# Patient Record
Sex: FEMALE | Race: BLACK OR AFRICAN AMERICAN | NOT HISPANIC OR LATINO | RURAL
[De-identification: names, ages, dates, MRNs, and addresses within clinical notes are randomized per-mention and may not be internally consistent; named-entity substitution may affect disease eponyms.]

---

## 2023-11-28 ENCOUNTER — HOSPITAL ENCOUNTER (OUTPATIENT)
Dept: RADIOLOGY | Facility: HOSPITAL | Age: 18
Discharge: HOME | End: 2023-11-28
Attending: ORTHOPAEDIC SURGERY
Payer: COMMERCIAL

## 2023-11-28 ENCOUNTER — OFFICE VISIT (OUTPATIENT)
Dept: ORTHOPEDICS | Facility: CLINIC | Age: 18
End: 2023-11-28
Payer: COMMERCIAL

## 2023-11-28 VITALS — WEIGHT: 146 LBS | HEIGHT: 67 IN | BODY MASS INDEX: 22.91 KG/M2

## 2023-11-28 DIAGNOSIS — S83.511A RUPTURE OF ANTERIOR CRUCIATE LIGAMENT OF RIGHT KNEE, INITIAL ENCOUNTER: ICD-10-CM

## 2023-11-28 DIAGNOSIS — M25.561 ACUTE PAIN OF RIGHT KNEE: ICD-10-CM

## 2023-11-28 DIAGNOSIS — S83.421A TEAR OF LATERAL COLLATERAL LIGAMENT OF RIGHT KNEE, INITIAL ENCOUNTER: ICD-10-CM

## 2023-11-28 DIAGNOSIS — M25.561 ACUTE PAIN OF RIGHT KNEE: Primary | ICD-10-CM

## 2023-11-28 DIAGNOSIS — S83.231A COMPLEX TEAR OF MEDIAL MENISCUS OF RIGHT KNEE AS CURRENT INJURY, INITIAL ENCOUNTER: ICD-10-CM

## 2023-11-28 PROCEDURE — 99204 OFFICE O/P NEW MOD 45 MIN: CPT | Mod: S$PBB,,, | Performed by: ORTHOPAEDIC SURGERY

## 2023-11-28 PROCEDURE — 73560 X-RAY EXAM OF KNEE 1 OR 2: CPT | Mod: 26,RT,, | Performed by: ORTHOPAEDIC SURGERY

## 2023-11-28 PROCEDURE — 73560 XR KNEE 1 OR 2 VIEW RIGHT: ICD-10-PCS | Mod: 26,RT,, | Performed by: ORTHOPAEDIC SURGERY

## 2023-11-28 PROCEDURE — 73560 X-RAY EXAM OF KNEE 1 OR 2: CPT | Mod: TC,RT

## 2023-11-28 PROCEDURE — 99203 OFFICE O/P NEW LOW 30 MIN: CPT | Mod: PBBFAC | Performed by: ORTHOPAEDIC SURGERY

## 2023-11-28 PROCEDURE — 99204 PR OFFICE/OUTPT VISIT, NEW, LEVL IV, 45-59 MIN: ICD-10-PCS | Mod: S$PBB,,, | Performed by: ORTHOPAEDIC SURGERY

## 2023-11-28 RX ORDER — TRAMADOL HYDROCHLORIDE 50 MG/1
50 TABLET ORAL EVERY 6 HOURS
Qty: 30 TABLET | Refills: 0 | Status: SHIPPED | OUTPATIENT
Start: 2023-11-28

## 2023-11-28 RX ORDER — NAPROXEN 500 MG/1
500 TABLET ORAL 2 TIMES DAILY WITH MEALS
Qty: 60 TABLET | Refills: 3 | Status: SHIPPED | OUTPATIENT
Start: 2023-11-28

## 2023-11-28 RX ORDER — METHYLPREDNISOLONE 4 MG/1
TABLET ORAL
Qty: 21 EACH | Refills: 0 | Status: SHIPPED | OUTPATIENT
Start: 2023-11-28 | End: 2023-12-19

## 2023-11-28 NOTE — PROGRESS NOTES
ASSESSMENT:      ICD-10-CM ICD-9-CM   1. Acute pain of right knee  M25.561 719.46   2. Rupture of anterior cruciate ligament of right knee, initial encounter  S83.511A 844.2   3. Tear of lateral collateral ligament of right knee, initial encounter  S83.421A 844.0   4. Complex tear of medial meniscus of right knee as current injury, initial encounter  S83.231A 836.0       PLAN:     Findings and treatment options were discussed with the patient regarding the diagnosis.   All questions were answered regarding Maggie Pulliam 's painful knee.      Natural history and expected course discussed. Questions answered. Educational materials distributed. Rest, ice, compression, and elevation (RICE) therapy. MRI.    Patient Instructions   Given the above exam findings, I suspect the patient has an ACL injury.  I have ordered and reviewed her radiographs today and would like to obtain advanced imaging as this time as I am highly concerned for meniscal, chondral, and/or ligamentous injury in this painful knee.  Typical operative and nonoperative treatment measures were reviewed in great detail today. Risks, benefits, and alternatives as it relates to this potential injury were discussed today.  Plan is to proceed with an MRI to assess for internal derangement. Will follow-up after study to discuss results. Will reconsider treatment options at that time.     This is a full-thickness ACL tear as well as injury to the posterolateral corner meniscus.  Plan will be for ACL reconstruction with quadriceps tendon autograft versus hamstring autograft.  Also plan for possible posterolateral corner reconstruction and meniscus repair.  Risks benefits alternatives were discussed.  Degree of convalescence following surgery was also discussed.  I communicated this over the phone after her MRI.  She is going to discuss with her family and get back in touch with us.  Medrol Dosepak naproxen and pain medication were today.  Will need 2 weeks of  preoperative pre rehabilitation to improve range of motion most likely prior surgery    IMAGING:   X-Ray Knee 1 or 2 View Right    Result Date: 11/28/2023  See Procedure Notes for results. IMPRESSION: Please see Ortho procedure notes for report.  This procedure was auto-finalized by: Virtual Radiologist   Two views right knee were obtained today demonstrating increased widening of the lateral joint space.  No discernible fracture or pathologic bone seen.  MRI Knee Without Contrast Right    Result Date: 11/28/2023  EXAMINATION: MRI KNEE WITHOUT CONTRAST RIGHT CLINICAL HISTORY: RIGHT KNEE PAIN; Pain in right knee TECHNIQUE: Axial sagittal and coronal imaging of the right knee is performed using T1, proton density, proton density fat-sat, STIR and gradient sequences. COMPARISON: None available FINDINGS: The anterior cruciate ligament is completely torn.  Posterior cruciate ligament is intact without evidence of tear. There is edema in the lateral femoral condyle near the sulcus.  Subchondral fracture line visible at this location.  There is edema in the posterior tibial condyles without distinct fracture line. There is edema in the posterior soft tissues and posterior capsule injury present.  Suggestion of Wrisberg ligament near the posterior horn lateral meniscus that is not visualized completely in the intercondylar notch. There is injury to the meniscopopliteal fascicles in the posterolateral corner.  Otherwise the menisci are normal in signal and morphology. No evidence of meniscal tear demonstrated.  There is edema in the parameniscal soft tissues adjacent to the posterior horn. There is fluid signal around the fibular collateral ligament.  The ligament appears intact.  Popliteus tendon appears in good position and intact.  Superficial medial collateral ligament is intact with adjacent fluid signal.  There is tearing of the deep fibers of the medial collateral ligament on the femoral side. The extensor mechanism  is intact and appears within normal limits. No other abnormal osseous of marrow signal or edema is seen. No focal cartilage defect is present.     Complete tear of anterior cruciate ligament.  Posterolateral corner injury.  Multiple contusions and with subchondral fracture line visible in the lateral femoral condyle.  Posterior capsule and including injury of the Wrisberg ligament.  Meniscocapsular injury adjacent the posterior horn medial meniscus and tearing of the deep fibers of the medial collateral ligament on the femoral side. Electronically signed by: Chi Dinh Date:    11/28/2023 Time:    12:02    X-Ray Knee 1 or 2 View Right    Result Date: 11/28/2023  See Procedure Notes for results. IMPRESSION: Please see Ortho procedure notes for report.  This procedure was auto-finalized by: Virtual Radiologist       CC: Knee pain    18 y.o. Female who presents as a new patient to me for evaluation of  right knee pain.    Occupation: student athlete  Pain has been present for 1 day.  Injury description Last night during her basketball game she landed wrong when she came down. She reports she felt a pop in her knee.   She applied ice last night after the injury. She was evaluated by her AT and referred to clinic this morning.   Mechanical symptoms, such as clicking, locking, and popping are not present.    Swelling and effusions  are present.   The patient does  describe symptoms of knee instability, such as the knee buckling and the knee giving way.   Symptoms are worsened with activity.  Better with rest. Treatment thus far has included rest, activity modifications, and oral medications.    she  has not had formal physical therapy.  she has not had prior injections injections into the knee.   she has not had previous advanced imaging such as MRI.     Here today to discuss diagnosis and treatment options.          REVIEW OF SYSTEMS:   Constitution: Negative. Negative for chills, fever and night sweats.     Hematologic/Lymphatic: Negative for bleeding problem. Does not bruise/bleed easily.   Skin: Negative for dry skin, itching and rash.   Musculoskeletal: Negative for falls. Positive for knee pain and muscle weakness.     All other review of symptoms were reviewed and found to be noncontributory.     PAST MEDICAL HISTORY:   History reviewed. No pertinent past medical history.    PAST SURGICAL HISTORY:   History reviewed. No pertinent surgical history.    FAMILY HISTORY:   Family History   Problem Relation Age of Onset    Hypertension Mother     Hypertension Father        SOCIAL HISTORY:   Social History     Socioeconomic History    Marital status: Single   Tobacco Use    Smoking status: Never    Smokeless tobacco: Never       MEDICATIONS:   No current outpatient medications on file.    ALLERGIES:   Review of patient's allergies indicates:   Allergen Reactions    Lavender         PHYSICAL EXAMINATION:    General    Nursing note and vitals reviewed.  HENT:   Nose: Nose normal.   Pulmonary/Chest: No respiratory distress. She exhibits no tenderness.   Abdominal: Soft. She exhibits no distension. There is no abdominal tenderness.   Psychiatric: Thought content normal.           Right Knee Exam     Tenderness   The patient is tender to palpation of the lateral joint line.    Range of Motion   Extension:  abnormal   Flexion:  abnormal     Tests   Meniscus   Astrid:  Medial - positive   Ligament Examination   Lachman: abnormal   MCL - Valgus: normal (0 to 2mm)  Pivot Shift: abnormal    Muscle Strength   Right Lower Extremity   Quadriceps:  4/5   Hamstrin/5         Orders Placed This Encounter   Procedures    MRI Knee Without Contrast Right     Standing Status:   Future     Number of Occurrences:   1     Standing Expiration Date:   2024     Order Specific Question:   Does the patient have or ever had a pacemaker or a defibrillator (Note: Some facilities may not be able to schedule an MRI for patients with  pacemakers and defibrillators. You should contact your local radiology dept to determine if this is the case.)?     Answer:   No     Order Specific Question:   Does the patient have an aneurysm or surgical clip, pump, nerve/brain stimulator, middle/inner ear prosthesis, or other metal implant or foreign object (bullet, shrapnel)? If they have a card related to their implant, ask them to bring it. Issues related to the implant may cause the MRI to be delayed.     Answer:   No     Order Specific Question:   Will the patient require sedation?     Answer:   No     Order Specific Question:   May the Radiologist modify the order per protocol to meet the clinical needs of the patient?     Answer:   Yes     Order Specific Question:   Does the patient have on a skin patch for medication with aluminized backing?     Answer:   No       Procedures

## 2023-11-28 NOTE — PATIENT INSTRUCTIONS
Given the above exam findings, I suspect the patient has an ACL injury.  I have ordered and reviewed her radiographs today and would like to obtain advanced imaging as this time as I am highly concerned for meniscal, chondral, and/or ligamentous injury in this painful knee.  Typical operative and nonoperative treatment measures were reviewed in great detail today. Risks, benefits, and alternatives as it relates to this potential injury were discussed today.  Plan is to proceed with an MRI to assess for internal derangement. Will follow-up after study to discuss results. Will reconsider treatment options at that time.

## 2024-02-12 DIAGNOSIS — S83.511A COMPLETE TEAR OF ANTERIOR CRUCIATE LIGAMENT OF KNEE, RIGHT, INITIAL ENCOUNTER: Primary | ICD-10-CM

## 2024-02-15 ENCOUNTER — CLINICAL SUPPORT (OUTPATIENT)
Dept: REHABILITATION | Facility: HOSPITAL | Age: 19
End: 2024-02-15
Payer: COMMERCIAL

## 2024-02-15 DIAGNOSIS — S83.511A COMPLETE TEAR OF ANTERIOR CRUCIATE LIGAMENT OF KNEE, RIGHT, INITIAL ENCOUNTER: ICD-10-CM

## 2024-02-15 PROCEDURE — 97110 THERAPEUTIC EXERCISES: CPT

## 2024-02-15 PROCEDURE — 97161 PT EVAL LOW COMPLEX 20 MIN: CPT

## 2024-02-15 PROCEDURE — 97014 ELECTRIC STIMULATION THERAPY: CPT

## 2024-02-15 NOTE — PLAN OF CARE
[facility]   Physical Therapy Initial Evaluation      Name: Maggie Pulliam  Clinic Number: [id number]    Therapy Diagnosis:   Encounter Diagnosis   Name Primary?    Complete tear of anterior cruciate ligament of knee, right, initial encounter         Physician: Juan M Lopez MD    Physician Orders: PT Eval and Treat right acl repair  Medical Diagnosis from Referral: Right ACL repair  Evaluation Date: 2/15/2024  Plan of Care Expiration: 4/10/24  Progress Note Due: 4/10/24  Date of Surgery: 1/16/24  Visit # / Visits authorized: 1/ 25   FOTO: 1/ 3    Precautions: Standard     Time In: 1125  Time Out: 1220  Total Billable Time: 55 minutes    Subjective     Date of onset: surgery 1/16/24 due to injury during basketball at [employer]     History of current condition - Maggie reports: she injured her right acl in a basketball game at [employer] on 11/27/23    Falls: during basketball injury    Imaging: MRI studies: outside of [facility]    Prior Therapy: she has been receiving therapy until last week and she is moving back to school and needs to continue PT.   Social History: patient lives  in athlete cottages at [employer]  Occupation: student  Prior Level of Function: independent   Current Level of Function: amb with crutches     Pain:  Current 2/10, worst 6/10, best 0/10   Location: bilateral knee    Description: stiff and achy  Aggravating Factors: brace bothers her.   Easing Factors:  removing brace and propping her let up    Patients goals: return to basketball.      Medical History:   No past medical history on file.    Surgical History:   Maggie Pulliam  has no past surgical history on file.    Medications:   Maggie has a current medication list which includes the following prescription(s): naproxen and tramadol.    Allergies:   Review of patient's allergies indicates:   Allergen Reactions    Lavender         Objective      Observation: patient amb into clinic with brace and unilateral crutch    Posture:  WFL      Range of Motion:   Knee Left active Left Passive Right Active R passive   Flexion 141 141 70 75   Extension 0 0 0 0     14 degree extension lag with SLR on right LE.       Lower Extremity Strength  Right LE  Left LE    Knee extension: 3-/5 Knee extension: 5/5   Knee flexion: 3-/5 Knee flexion: 5/5   Hip flexion: 4/5 Hip flexion: 5/5   Hip extension:  4/5 Hip extension: 5/5   Hip abduction: 4/5 Hip abduction: 5/5   Hip adduction: 4/5 Hip adduction 5/5   Ankle dorsiflexion: 5/5 Ankle dorsiflexion: 5/5   Ankle plantarflexion: 5/5 Ankle plantarflexion: 5/5           Special Tests:  None indicated due to recent surgery    Function:    - Sit <--> Stand:independent   - Bed Mobility: independent        Joint Mobility: WFL  Patellar    Palpation: no pain    Sensation: WFL    Edema: See measurements below    Girth Measurement Joint line   Left 33.5 cm cm   Right 35.5 cm        Intake Outcome Measure for FOTO Intake Survey    Therapist reviewed FOTO scores for Maggie Pulliam on 2/15/2024.   FOTO report - see Media section or FOTO account episode details.    Intake Score: 63%         Treatment     Total Treatment time (time-based codes) separate from Evaluation: 25 minutes     Maggie received the treatments listed below:      therapeutic exercises to develop strength and ROM for 25 minutes including:  Heel slides with ankle pumps at end range x 10 with 10 seconds each.  Flexion 100 degrees after heel slides.  Short sit knee flexion stretch with ankle pump at end range and forward body movement to increase ROM.   All of the following performed with the assistance of NMES  Quad sets x 4 min  10 sec on and 20 seconds off  Bilateral SAQ at 0-15 degrees 10 sec on and 20 sec off with manual assist for AAROM on right x 4 min  SLR 10 sec on and 20 sec off with verbal and tactile cues for knee extension control x 4 min.     supervised modalities after being cleared for contradictions: NMES Electrical Stimulation:  Maggie received  NMES Electrical Stimulation to elicit muscle contraction of the right quad. Pt received stimulation at a rate of 50 pps with symmetric current, 2 second ramp with 10 second on time and 20 second off time for a total of 12 min. Patient tolerated treatment well without any adverse effects.     Patient Education and Home Exercises     Education provided:   - HEP, Plan of care, and eval results.     Written Home Exercises Provided: yes. Exercises were reviewed and Maggie was able to demonstrate them prior to the end of the session.  Maggie demonstrated good  understanding of the education provided. See EMR under Patient Instructions for exercises provided during therapy sessions.    Assessment     Maggie is a 18 y.o. female referred to outpatient Physical Therapy with a medical diagnosis of right ACL repair. Patient presents with limited ROM, decreased strength, dependence on brace and crutch, poor quad control and need for skilled PT.     Patient prognosis is Excellent.   Patient will benefit from skilled outpatient Physical Therapy to address the deficits stated above and in the chart below, provide patient /family education, and to maximize patientt's level of independence.     Plan of care discussed with patient: Yes  Patient's spiritual, cultural and educational needs considered and patient is agreeable to the plan of care and goals as stated below:     Anticipated Barriers for therapy: none at this time    Medical Necessity is demonstrated by the following  History  Co-morbidities and personal factors that may impact the plan of care [x] LOW: no personal factors / co-morbidities  [] MODERATE: 1-2 personal factors / co-morbidities  [] HIGH: 3+ personal factors / co-morbidities    Moderate / High Support Documentation:   Co-morbidities affecting plan of care:     Personal Factors:   age     Examination  Body Structures and Functions, activity limitations and participation restrictions that may impact the plan of care  [] LOW: addressing 1-2 elements  [x] MODERATE: 3+ elements  [] HIGH: 4+ elements (please support below)    Moderate / High Support Documentation: ROM, strength, neuro control gait, etc.      Clinical Presentation [] LOW: stable  [x] MODERATE: Evolving  [] HIGH: Unstable     Decision Making/ Complexity Score: low       Goals:  Short Term Goals: 4 weeks   Pt will increase right knee flexion to 115, knee extension to 0 degrees, and quad lag to 0 degrees to allow patient normal gait pattern.  Pt will increase right knee strength to 3+/5 to allow patient to safely return to prior level of function   3.  Pt will report decrease in pain to 2/10 to improve quality of life  4.  Patient will ambulate 150 feet independently without AD with no deviation       Long Term Goals: 8 weeks   Pt will increase right knee flexion to 125, knee extension to 0 degrees, and quad lag to 0 degrees to allow patient normal gait pattern.  Pt will increase right knee strength to 4+/5 to allow patient to safely return to prior level of function   3.  Pt will report decrease in pain to 1/10 to improve quality of life  4.  Patient will ascend and descend 4 steps with no rail independently alternating steps independently without deviation   5. Patient will run on level surface x 100 feet with no deviation for return to sports.     Plan     Plan of care Certification: 2/15/2024 to 4/10/24.    Outpatient Physical Therapy 3 times weekly for 8 weeks to include the following interventions: Electrical Stimulation NMES, Gait Training, Manual Therapy, Moist Heat/ Ice, Neuromuscular Re-ed, Patient Education, Therapeutic Activities, and Therapeutic Exercise.     ADIN MULTANI, PT, ATP  02/15/2024              Physician's Signature: _________________________________________ Date: ________________

## 2024-02-19 ENCOUNTER — CLINICAL SUPPORT (OUTPATIENT)
Dept: REHABILITATION | Facility: HOSPITAL | Age: 19
End: 2024-02-19
Payer: COMMERCIAL

## 2024-02-19 DIAGNOSIS — S83.511A COMPLETE TEAR OF ANTERIOR CRUCIATE LIGAMENT OF KNEE, RIGHT, INITIAL ENCOUNTER: Primary | ICD-10-CM

## 2024-02-19 PROCEDURE — 97112 NEUROMUSCULAR REEDUCATION: CPT | Mod: CQ

## 2024-02-19 PROCEDURE — 97014 ELECTRIC STIMULATION THERAPY: CPT | Mod: CQ

## 2024-02-19 PROCEDURE — 97110 THERAPEUTIC EXERCISES: CPT | Mod: CQ

## 2024-02-19 NOTE — PROGRESS NOTES
[facility]   Physical Therapy Treatment Note      Name: Maggie Pulliam  [facility] Number: [id number]    Therapy Diagnosis:   Encounter Diagnosis   Name Primary?    Complete tear of anterior cruciate ligament of knee, right, initial encounter Yes     Physician: Order, Paper    Visit Date: 2/19/2024    Physician Orders: PT Eval and Treat right acl repair  Medical Diagnosis from Referral: Right ACL repair  Evaluation Date: 2/15/2024  Plan of Care Expiration: 4/10/24  Progress Note Due: 4/10/24  Date of Surgery: 1/16/24  Visit # / Visits authorized: 2/ 25   FOTO: 1/ 3    PTA Visit #: 1/5     Time In: 800  Time Out: 853  Total Billable Time: 53 minutes    Subjective     Pt reports: no pain at this time.  She was compliant with home exercise program.  Response to previous treatment: good  Functional change: none    Pain: 0/10  Location: right knee      Objective      Knee Left active Left Passive Right Active R passive   Flexion 141 141 70 75   Extension 0 0 0 0      14 degree extension lag with SLR on right LE.         Lower Extremity Strength  Right LE   Left LE     Knee extension: 3-/5 Knee extension: 5/5   Knee flexion: 3-/5 Knee flexion: 5/5   Hip flexion: 4/5 Hip flexion: 5/5   Hip extension:  4/5 Hip extension: 5/5   Hip abduction: 4/5 Hip abduction: 5/5   Hip adduction: 4/5 Hip adduction 5/5   Ankle dorsiflexion: 5/5 Ankle dorsiflexion: 5/5   Ankle plantarflexion: 5/5 Ankle plantarflexion: 5/5           Treatment     Maggie received the treatments listed below:      therapeutic exercises to develop strength, ROM, and flexibility to Right Lower extremity for 26 minutes including:  Quad sets x 4 minutes with towel underneath knee (with NMES)  SAQ x 4 minutes on large bolster (with NMES)  SLR x 4 minutes (with NMES)  Knee flexion stretch with strap 10 x 10s/h with ankle pump at end range in long sitting   Seated heel slides 2 x 10 with 5s/h at end range   Seated hip adduction 2 x 20 3s/h      neuromuscular re-education activities to improve: Balance, Coordination, Kinesthetic, and Proprioception for 15 minutes. The following activities were included:  Standing in // bars patient performed Heel raises, R SLR, Hip abduction, hs curls, mini squats  2 x 10       supervised modalities after being cleared for contradictions: NMES Electrical Stimulation:  Maggie received NMES Electrical Stimulation to elicit muscle contraction of the R Quadriceps. Pt received stimulation at a rate of 29 pps with symmetric current, ramp of 2 seconds with 10 second on time and 20 second off time for 12 minutes. Patient tolerated treatment well without any adverse effects.       Patient Education and Home Exercises       Education provided:   - Body mechanics to execute exercises correctly     Written Home Exercises Provided: yes. Exercises were reviewed and Maggie was able to demonstrate them prior to the end of the session.  Maggie demonstrated good  understanding of the education provided. See EMR under Patient Instructions for exercises provided during therapy sessions    Assessment   Maggie tolerated treatment well this morning being able to progress from small bolster to large bolster with SAQ.     Maggie is a 18 y.o. female referred to outpatient Physical Therapy with a medical diagnosis of right ACL repair. Patient presents with limited ROM, decreased strength, dependence on brace and crutch, poor quad control and need for skilled PT.       Maggie Is progressing well towards her goals.   Pt prognosis is Good.     Pt will continue to benefit from skilled outpatient physical therapy to address the deficits listed in the problem list box on initial evaluation, provide pt/family education and to maximize pt's level of independence in the home and community environment.     Pt's spiritual, cultural and educational needs considered and pt agreeable to plan of care and goals.     Anticipated barriers to physical therapy:  none      Short Term Goals: 4 weeks   Pt will increase right knee flexion to 115, knee extension to 0 degrees, and quad lag to 0 degrees to allow patient normal gait pattern.  Pt will increase right knee strength to 3+/5 to allow patient to safely return to prior level of function   3.  Pt will report decrease in pain to 2/10 to improve quality of life  4.  Patient will ambulate 150 feet independently without AD with no deviation         Long Term Goals: 8 weeks   Pt will increase right knee flexion to 125, knee extension to 0 degrees, and quad lag to 0 degrees to allow patient normal gait pattern.  Pt will increase right knee strength to 4+/5 to allow patient to safely return to prior level of function   3.  Pt will report decrease in pain to 1/10 to improve quality of life  4.  Patient will ascend and descend 4 steps with no rail independently alternating steps independently without deviation   5. Patient will run on level surface x 100 feet with no deviation for return to sports.     Plan     Outpatient Physical Therapy 3 times weekly for 8 weeks to include the following interventions: Electrical Stimulation NMES, Gait Training, Manual Therapy, Moist Heat/ Ice, Neuromuscular Re-ed, Patient Education, Therapeutic Activities, and Therapeutic Exercise.     Patricio Chris, PTA

## 2024-02-21 ENCOUNTER — CLINICAL SUPPORT (OUTPATIENT)
Dept: REHABILITATION | Facility: HOSPITAL | Age: 19
End: 2024-02-21
Payer: COMMERCIAL

## 2024-02-21 DIAGNOSIS — S83.511A COMPLETE TEAR OF ANTERIOR CRUCIATE LIGAMENT OF KNEE, RIGHT, INITIAL ENCOUNTER: Primary | ICD-10-CM

## 2024-02-21 PROCEDURE — 97110 THERAPEUTIC EXERCISES: CPT | Mod: CQ

## 2024-02-21 PROCEDURE — 97112 NEUROMUSCULAR REEDUCATION: CPT | Mod: CQ

## 2024-02-21 PROCEDURE — 97014 ELECTRIC STIMULATION THERAPY: CPT | Mod: CQ

## 2024-02-21 NOTE — PROGRESS NOTES
[facility]   Physical Therapy Treatment Note      Name: Maggie Pulliam  Clinic Number: [id number]    Therapy Diagnosis:   Encounter Diagnosis   Name Primary?    Complete tear of anterior cruciate ligament of knee, right, initial encounter Yes     Physician: Juan M Lopez MD    Visit Date: 2/21/2024    Physician Orders: PT Eval and Treat right acl repair  Medical Diagnosis from Referral: Right ACL repair  Evaluation Date: 2/15/2024  Plan of Care Expiration: 4/10/24  Progress Note Due: 4/10/24  Date of Surgery: 1/16/24  Visit # / Visits authorized: 2/ 25   FOTO: 1/ 3    PTA Visit #: 1/5     Time In:1050  Time Out: 1135  Total Billable Time: 45 minutes    Subjective     Pt reports: no pain at this time.  She was compliant with home exercise program.  Response to previous treatment: good  Functional change: none    Pain: 0/10  Location: right knee      Objective      Knee Left active Left Passive Right Active R passive   Flexion 141 141 70 75   Extension 0 0 0 0      14 degree extension lag with SLR on right LE.         Lower Extremity Strength  Right LE   Left LE     Knee extension: 3-/5 Knee extension: 5/5   Knee flexion: 3-/5 Knee flexion: 5/5   Hip flexion: 4/5 Hip flexion: 5/5   Hip extension:  4/5 Hip extension: 5/5   Hip abduction: 4/5 Hip abduction: 5/5   Hip adduction: 4/5 Hip adduction 5/5   Ankle dorsiflexion: 5/5 Ankle dorsiflexion: 5/5   Ankle plantarflexion: 5/5 Ankle plantarflexion: 5/5           Treatment     Maggie received the treatments listed below:    -Protocol 2/16-3/5  therapeutic exercises to develop strength, ROM, and flexibility to Right Lower extremity for 18 minutes including:  Quad sets x 4 minutes with towel underneath knee (with NMES)  SAQ x 4 minutes on large bolster (with NMES)  SLR x 4 minutes (with NMES)  Knee flexion stretch with strap 10 x 10s/h with ankle pump at end range in long sitting   Seated heel slides 2 x 10 with 5s/h at end range   Seated hip  adduction 2 x 20 3s/h   Slant board stretch 3 x 30s/h    neuromuscular re-education activities to improve: Balance, Coordination, Kinesthetic, and Proprioception for 15 minutes. The following activities were included:  Standing in // bars patient performed Heel raises, R SLR, Hip abduction, hs curls, mini squats  2 x 10       supervised modalities after being cleared for contradictions: NMES Electrical Stimulation:  Maggie received NMES Electrical Stimulation to elicit muscle contraction of the R Quadriceps. Pt received stimulation at a rate of 29 pps with symmetric current, ramp of 2 seconds with 10 second on time and 20 second off time for 12 minutes. Patient tolerated treatment well without any adverse effects.       Patient Education and Home Exercises       Education provided:   - Body mechanics to execute exercises correctly     Written Home Exercises Provided: yes. Exercises were reviewed and Maggie was able to demonstrate them prior to the end of the session.  Maggie demonstrated good  understanding of the education provided. See EMR under Patient Instructions for exercises provided during therapy sessions    Assessment   Maggie tolerated treatment well this morning. She had no problems with any new exercises, and says her HEP is going well with no difficulty. Maggie was accompanied by her  during this treatment session.    Maggie is a 18 y.o. female referred to outpatient Physical Therapy with a medical diagnosis of right ACL repair. Patient presents with limited ROM, decreased strength, dependence on brace and crutch, poor quad control and need for skilled PT.       Maggie Is progressing well towards her goals.   Pt prognosis is Good.     Pt will continue to benefit from skilled outpatient physical therapy to address the deficits listed in the problem list box on initial evaluation, provide pt/family education and to maximize pt's level of independence in the home and community environment.     Pt's  spiritual, cultural and educational needs considered and pt agreeable to plan of care and goals.     Anticipated barriers to physical therapy: none      Short Term Goals: 4 weeks   Pt will increase right knee flexion to 115, knee extension to 0 degrees, and quad lag to 0 degrees to allow patient normal gait pattern.  Pt will increase right knee strength to 3+/5 to allow patient to safely return to prior level of function   3.  Pt will report decrease in pain to 2/10 to improve quality of life  4.  Patient will ambulate 150 feet independently without AD with no deviation         Long Term Goals: 8 weeks   Pt will increase right knee flexion to 125, knee extension to 0 degrees, and quad lag to 0 degrees to allow patient normal gait pattern.  Pt will increase right knee strength to 4+/5 to allow patient to safely return to prior level of function   3.  Pt will report decrease in pain to 1/10 to improve quality of life  4.  Patient will ascend and descend 4 steps with no rail independently alternating steps independently without deviation   5. Patient will run on level surface x 100 feet with no deviation for return to sports.     Plan     Outpatient Physical Therapy 3 times weekly for 8 weeks to include the following interventions: Electrical Stimulation NMES, Gait Training, Manual Therapy, Moist Heat/ Ice, Neuromuscular Re-ed, Patient Education, Therapeutic Activities, and Therapeutic Exercise.     Patricio Chris, PTA

## 2024-02-23 ENCOUNTER — CLINICAL SUPPORT (OUTPATIENT)
Dept: REHABILITATION | Facility: HOSPITAL | Age: 19
End: 2024-02-23
Payer: COMMERCIAL

## 2024-02-23 DIAGNOSIS — S83.511A COMPLETE TEAR OF ANTERIOR CRUCIATE LIGAMENT OF KNEE, RIGHT, INITIAL ENCOUNTER: Primary | ICD-10-CM

## 2024-02-23 PROCEDURE — 97112 NEUROMUSCULAR REEDUCATION: CPT | Mod: CQ

## 2024-02-23 PROCEDURE — 97110 THERAPEUTIC EXERCISES: CPT | Mod: CQ

## 2024-02-23 NOTE — PROGRESS NOTES
"[facility]   Physical Therapy Treatment Note      Name: Maggie Pulliam  Clinic Number: [id number]    Therapy Diagnosis:   Encounter Diagnosis   Name Primary?    Complete tear of anterior cruciate ligament of knee, right, initial encounter Yes     Physician: Juan M Lopez MD    Visit Date: 2/23/2024    Physician Orders: PT Eval and Treat right acl repair  Medical Diagnosis from Referral: Right ACL repair  Evaluation Date: 2/15/2024  Plan of Care Expiration: 4/10/24  Progress Note Due: 4/10/24  Date of Surgery: 1/16/24  Visit # 3/25  Visits authorized: 25   FOTO: 1/ 3    PTA Visit #: 3/5     Time In:1055  Time Out: 1140  Total Billable Time: 45 minutes    Subjective     Pt reports: "no pain but is stiff this morning"  She was compliant with home exercise program.  Response to previous treatment: good  Functional change: none    Pain: 0/10  Location: right knee      Objective      Knee Left active Left Passive Right Active R passive   Flexion 141 141 70 75   Extension 0 0 0 0      14 degree extension lag with SLR on right LE.         Lower Extremity Strength  Right LE   Left LE     Knee extension: 3-/5 Knee extension: 5/5   Knee flexion: 3-/5 Knee flexion: 5/5   Hip flexion: 4/5 Hip flexion: 5/5   Hip extension:  4/5 Hip extension: 5/5   Hip abduction: 4/5 Hip abduction: 5/5   Hip adduction: 4/5 Hip adduction 5/5   Ankle dorsiflexion: 5/5 Ankle dorsiflexion: 5/5   Ankle plantarflexion: 5/5 Ankle plantarflexion: 5/5           Treatment     Maggie received the treatments listed below:    -Protocol 2/16-3/5  therapeutic exercises to develop strength, ROM, and flexibility to Right Lower extremity for 30 minutes including:  Quad sets 2 x 12 with towel underneath knee   SAQ 2 x 10 on large bolster   SLR, side raises 2 x 10  Knee flexion stretch with strap 10 x 10s/h with ankle pump at end range in long sitting   Hamstring stretch 5 x 10s/h   Seated heel slides 2 x 10 with 3s/h at end range   Seated " hip adduction 2 x 20 3s/h   Slant board stretch 3 x 30s/h    neuromuscular re-education activities to improve: Balance, Coordination, Kinesthetic, and Proprioception to right knee for 15 minutes. The following activities were included:  Standing in // bars patient performed Heel raises, R SLR, hip flexion, Hip abduction, hs curls, mini squats 2 x 12       Not today - supervised modalities after being cleared for contradictions: NMES Electrical Stimulation:  Maggie received NMES Electrical Stimulation to elicit muscle contraction of the R Quadriceps. Pt received stimulation at a rate of 29 pps with symmetric current, ramp of 2 seconds with 10 second on time and 20 second off time for 12 minutes. Patient tolerated treatment well without any adverse effects.       Patient Education and Home Exercises       Education provided:   - Body mechanics to execute exercises correctly     Written Home Exercises Provided: yes. Exercises were reviewed and Maggie was able to demonstrate them prior to the end of the session.  Maggie demonstrated good  understanding of the education provided. See EMR under Patient Instructions for exercises provided during therapy sessions    Assessment   Maggie continues to progress in therapy each session. She is compliant with HEP, and says she feels like her knee is getting better. She ambulated in OP gym with no AD and no complaints of pain or difficulty. Maggie did drive to therapy today alone, and says she is not currently on any pain medications. She sees orthopedist via tele med call on February 27th where they will discuss WB status, AD usage, and her brace.     Maggie is a 18 y.o. female referred to outpatient Physical Therapy with a medical diagnosis of right ACL repair. Patient presents with limited ROM, decreased strength, dependence on brace and crutch, poor quad control and need for skilled PT.       Maggie Is progressing well towards her goals.   Pt prognosis is Good.     Pt will continue  to benefit from skilled outpatient physical therapy to address the deficits listed in the problem list box on initial evaluation, provide pt/family education and to maximize pt's level of independence in the home and community environment.     Pt's spiritual, cultural and educational needs considered and pt agreeable to plan of care and goals.     Anticipated barriers to physical therapy: none      Short Term Goals: 4 weeks   Pt will increase right knee flexion to 115, knee extension to 0 degrees, and quad lag to 0 degrees to allow patient normal gait pattern.  Pt will increase right knee strength to 3+/5 to allow patient to safely return to prior level of function   3.  Pt will report decrease in pain to 2/10 to improve quality of life GOAL MET  4.  Patient will ambulate 150 feet independently without AD with no deviation         Long Term Goals: 8 weeks   Pt will increase right knee flexion to 125, knee extension to 0 degrees, and quad lag to 0 degrees to allow patient normal gait pattern.  Pt will increase right knee strength to 4+/5 to allow patient to safely return to prior level of function   3.  Pt will report decrease in pain to 1/10 to improve quality of life  4.  Patient will ascend and descend 4 steps with no rail independently alternating steps independently without deviation   5. Patient will run on level surface x 100 feet with no deviation for return to sports.     Plan     Outpatient Physical Therapy 3 times weekly for 8 weeks to include the following interventions: Electrical Stimulation NMES, Gait Training, Manual Therapy, Moist Heat/ Ice, Neuromuscular Re-ed, Patient Education, Therapeutic Activities, and Therapeutic Exercise.     Patricio Chris, PTA

## 2024-02-28 ENCOUNTER — CLINICAL SUPPORT (OUTPATIENT)
Dept: REHABILITATION | Facility: HOSPITAL | Age: 19
End: 2024-02-28
Payer: COMMERCIAL

## 2024-02-28 DIAGNOSIS — S83.511A COMPLETE TEAR OF ANTERIOR CRUCIATE LIGAMENT OF KNEE, RIGHT, INITIAL ENCOUNTER: Primary | ICD-10-CM

## 2024-02-28 PROCEDURE — 97112 NEUROMUSCULAR REEDUCATION: CPT | Mod: CQ

## 2024-02-28 PROCEDURE — 97110 THERAPEUTIC EXERCISES: CPT | Mod: CQ

## 2024-02-28 NOTE — PROGRESS NOTES
[facility]   Physical Therapy Treatment Note      Name: Maggie Pulliam  Clinic Number: [id number]    Therapy Diagnosis:   Encounter Diagnosis   Name Primary?    Complete tear of anterior cruciate ligament of knee, right, initial encounter Yes     Physician: Juan M Lopez MD    Visit Date: 2/28/2024    Physician Orders: PT Eval and Treat right acl repair  Medical Diagnosis from Referral: Right ACL repair  Evaluation Date: 2/15/2024  Plan of Care Expiration: 4/10/24  Progress Note Due: 4/10/24  Date of Surgery: 1/16/24  Visit # 5/25  Visits authorized: 25   FOTO: 1/ 3    PTA Visit #: 4/5     Time In:1100  Time Out: 1145  Total Billable Time: 45 minutes    Subjective     Pt reports: no complaints at this time  She was compliant with home exercise program.  Response to previous treatment: good  Functional change: none    Pain: 0/10  Location: right knee      Objective      Knee Left active Left Passive Right Active R passive   Flexion 141 141 70 75   Extension 0 0 0 0      14 degree extension lag with SLR on right LE.         Lower Extremity Strength  Right LE   Left LE     Knee extension: 3-/5 Knee extension: 5/5   Knee flexion: 3-/5 Knee flexion: 5/5   Hip flexion: 4/5 Hip flexion: 5/5   Hip extension:  4/5 Hip extension: 5/5   Hip abduction: 4/5 Hip abduction: 5/5   Hip adduction: 4/5 Hip adduction 5/5   Ankle dorsiflexion: 5/5 Ankle dorsiflexion: 5/5   Ankle plantarflexion: 5/5 Ankle plantarflexion: 5/5       2/28/24  Knee Left active Left Passive Right Active R passive   Flexion 141 141 115 120   Extension 0 0 0 0      14 degree extension lag with SLR on right LE.        Treatment     Maggie received the treatments listed below:    -Protocol 2/16-3/5  therapeutic exercises to develop strength, ROM, and flexibility to Right Lower extremity for 30 minutes including:  Quad sets 2 x 12 with small bolster under ankle  SAQ 2 x 10 on large bolster   SLR, side raises , hip adduction, hip  extension, hs curls  2 x 10  Knee flexion stretch with strap 10 x 10s/h with ankle pump at end range in long sitting - not today  Hamstring stretch 5 x 10s/h   Seated heel slides 2 x 10 with 3s/h at end range   Seated hip adduction 2 x 20 3s/h - not today   Slant board stretch 3 x 30s/h    neuromuscular re-education activities to improve: Balance, Coordination, Kinesthetic, and Proprioception to right knee for 15 minutes. The following activities were included:  Standing in // bars patient performed Heel raises x 30 , R SLR,Hip abduction, hip adduction, hip extension with red band hs curls, mini squats on zari ball 2 x 10       Not today - supervised modalities after being cleared for contradictions: NMES Electrical Stimulation:  Maggie received NMES Electrical Stimulation to elicit muscle contraction of the R Quadriceps. Pt received stimulation at a rate of 29 pps with symmetric current, ramp of 2 seconds with 10 second on time and 20 second off time for 12 minutes. Patient tolerated treatment well without any adverse effects.       Patient Education and Home Exercises       Education provided:   - Body mechanics to execute exercises correctly     Written Home Exercises Provided: yes. Exercises were reviewed and Maggie was able to demonstrate them prior to the end of the session.  Maggie demonstrated good  understanding of the education provided. See EMR under Patient Instructions for exercises provided during therapy sessions    Assessment   Maggie tolerated treatment well this morning with no complaints of pain or difficulty with new exercises. She has been cleared to ambulate without AD and knee brace. Patient has met some STGs during this visit and continues to progress functionally.    Maggie is a 18 y.o. female referred to outpatient Physical Therapy with a medical diagnosis of right ACL repair. Patient presents with limited ROM, decreased strength, dependence on brace and crutch, poor quad control and need for  skilled PT.     Maggie Is progressing well towards her goals.   Pt prognosis is Good.     Pt will continue to benefit from skilled outpatient physical therapy to address the deficits listed in the problem list box on initial evaluation, provide pt/family education and to maximize pt's level of independence in the home and community environment.     Pt's spiritual, cultural and educational needs considered and pt agreeable to plan of care and goals.     Anticipated barriers to physical therapy: none      Short Term Goals: 4 weeks   Pt will increase right knee flexion to 115, knee extension to 0 degrees, and quad lag to 0 degrees to allow patient normal gait pattern. GOAL MET  Pt will increase right knee strength to 3+/5 to allow patient to safely return to prior level of function   3.  Pt will report decrease in pain to 2/10 to improve quality of life GOAL MET  4.  Patient will ambulate 150 feet independently without AD with no deviation         Long Term Goals: 8 weeks   Pt will increase right knee flexion to 125, knee extension to 0 degrees, and quad lag to 0 degrees to allow patient normal gait pattern.  Pt will increase right knee strength to 4+/5 to allow patient to safely return to prior level of function   3.  Pt will report decrease in pain to 1/10 to improve quality of life  4.  Patient will ascend and descend 4 steps with no rail independently alternating steps independently without deviation   5. Patient will run on level surface x 100 feet with no deviation for return to sports.     Plan     Outpatient Physical Therapy 3 times weekly for 8 weeks to include the following interventions: Electrical Stimulation NMES, Gait Training, Manual Therapy, Moist Heat/ Ice, Neuromuscular Re-ed, Patient Education, Therapeutic Activities, and Therapeutic Exercise.     Patricio Chris, PTA

## 2024-03-05 ENCOUNTER — CLINICAL SUPPORT (OUTPATIENT)
Dept: REHABILITATION | Facility: HOSPITAL | Age: 19
End: 2024-03-05
Payer: COMMERCIAL

## 2024-03-05 DIAGNOSIS — S83.511A COMPLETE TEAR OF ANTERIOR CRUCIATE LIGAMENT OF KNEE, RIGHT, INITIAL ENCOUNTER: Primary | ICD-10-CM

## 2024-03-05 PROCEDURE — 97110 THERAPEUTIC EXERCISES: CPT

## 2024-03-05 PROCEDURE — 97112 NEUROMUSCULAR REEDUCATION: CPT

## 2024-03-05 NOTE — PROGRESS NOTES
[facility]   Physical Therapy Treatment Note      Name: Maggie Pulliam  Clinic Number: [id number]    Therapy Diagnosis:   Encounter Diagnosis   Name Primary?    Complete tear of anterior cruciate ligament of knee, right, initial encounter Yes     Physician: Juan M Lopez MD     Visit Date: 3/5/2024    Physician Orders: PT Eval and Treat right acl repair  Medical Diagnosis from Referral: Right ACL repair  Evaluation Date: 2/15/2024  Plan of Care Expiration: 4/10/24  Progress Note Due: 4/10/24  Date of Surgery: 1/16/24  Visit # 6/25  Visits authorized: 25   FOTO: 1/ 3    PTA Visit #: 0/5     Time In:800  Time Out: 845  Total Billable Time: 45 minutes    Subjective     Pt reports: no complaints at this time  She was compliant with home exercise program.  Response to previous treatment: good  Functional change: none    Pain: 0/10  Location: right knee      Objective      3/5/24  Knee Left active Left Passive Right Active R passive   Flexion 141 141 115 118   Extension 0 0 0 0      3/5/24  -5 degree extension lag with SLR on right LE.         Lower Extremity Strength 3/5/24  Right LE   Left LE     Knee extension: 3-/5 Knee extension: 5/5   Knee flexion: 3-/5 Knee flexion: 5/5   Hip flexion: 4+/5 Hip flexion: 5/5   Hip extension:  4+/5 Hip extension: 5/5   Hip abduction: 4+/5 Hip abduction: 5/5   Hip adduction: 5/5 Hip adduction 5/5   Ankle dorsiflexion: 5/5 Ankle dorsiflexion: 5/5   Ankle plantarflexion: 5/5 Ankle plantarflexion: 5/5      Girth measurements 6 inches above right patella: 43 cm (16.9 inches)       Treatment     Maggie received the treatments listed below:    -Protocol Weeks 8-11  3/4 to-4/2  Begin treadmill walking on next visit  therapeutic exercises to develop strength, ROM, and flexibility to Right Lower extremity for 35 minutes including:  Quad sets 2 x 12 with small bolster under ankle  SAQ 3 x 10 on large bolster 1.5#  Standing 4 way 2 x 10 red band  SLR, side raises , hip  adduction, hip extension, hs curls  2 x 10 1.5#  lunges straight; lunges side 2 x 10 each  wall squats with knee ball 2 x 10    Knee flexion stretch with strap 10 x 10s/h with ankle pump at end range in long sitting - not today  Hamstring stretch 5 x 10s/h -Not today  Seated heel slides 2 x 10 with 3s/h at end range Not today  Seated hip adduction 2 x 20 3s/h - not today   Slant board stretch 3 x 30s/h    Balance and proprioception (10min)  bosu ball 2 x 60 sec  Right SLS 2 x 60  Right SLS foam pad 2 x 60    Knee flexion stretch with strap 10 x 10s/h with ankle pump at end range in long sitting - not today  Hamstring stretch 5 x 10s/h -Not today  Seated heel slides 2 x 10 with 3s/h at end range Not today  Seated hip adduction 2 x 20 3s/h - not today   Slant board stretch 3 x 30s/h    neuromuscular re-education activities to improve: Balance, Coordination, Kinesthetic, and Proprioception to right knee for 15 minutes. The following activities were included:  Standing in // bars patient performed Heel raises x 30 , R SLR,Hip abduction, hip adduction, hip extension with red band hs curls, mini squats on zari ball 2 x 10       Patient Education and Home Exercises       Education provided:   -  Maggie  received verbal and tactile cues to facilitate proper execution of exercises and body mechanics.    Written Home Exercises Provided: Patient instructed to cont prior HEP.     Assessment   Maggie is tolerating her knee rehab well. She is now entering Protocol Weeks 8 to 11 Post Op. She denies pain at rest and with activity.    Maggie is a 18 y.o. female referred to outpatient Physical Therapy with a medical diagnosis of right ACL repair. Patient presents with limited ROM, decreased strength, dependence on brace and crutch, poor quad control and need for skilled PT.     Maggie Is progressing well towards her goals.   Pt prognosis is Good.     Pt will continue to benefit from skilled outpatient physical therapy to address the  deficits listed in the problem list box on initial evaluation, provide pt/family education and to maximize pt's level of independence in the home and community environment.     Pt's spiritual, cultural and educational needs considered and pt agreeable to plan of care and goals.     Anticipated barriers to physical therapy: none      Short Term Goals: 4 weeks   Pt will increase right knee flexion to 115, knee extension to 0 degrees, and quad lag to 0 degrees to allow patient normal gait pattern. GOAL MET  Pt will increase right knee strength to 3+/5 to allow patient to safely return to prior level of function   3.  Pt will report decrease in pain to 2/10 to improve quality of life GOAL MET  4.  Patient will ambulate 150 feet independently without AD with no deviation Goal Met.        Long Term Goals: 8 weeks   Pt will increase right knee flexion to 125, knee extension to 0 degrees, and quad lag to 0 degrees to allow patient normal gait pattern.  Pt will increase right knee strength to 4+/5 to allow patient to safely return to prior level of function   3.  Pt will report decrease in pain to 1/10 to improve quality of life  4.  Patient will ascend and descend 4 steps with no rail independently alternating steps independently without deviation   5. Patient will run on level surface x 100 feet with no deviation for return to sports.     Plan     Outpatient Physical Therapy 3 times weekly for 8 weeks to include the following interventions: Electrical Stimulation NMES, Gait Training, Manual Therapy, Moist Heat/ Ice, Neuromuscular Re-ed, Patient Education, Therapeutic Activities, and Therapeutic Exercise.     Chadwick Vilchis, PT

## 2024-03-06 ENCOUNTER — CLINICAL SUPPORT (OUTPATIENT)
Dept: REHABILITATION | Facility: HOSPITAL | Age: 19
End: 2024-03-06
Payer: COMMERCIAL

## 2024-03-06 DIAGNOSIS — S83.511A COMPLETE TEAR OF ANTERIOR CRUCIATE LIGAMENT OF KNEE, RIGHT, INITIAL ENCOUNTER: Primary | ICD-10-CM

## 2024-03-06 PROCEDURE — 97112 NEUROMUSCULAR REEDUCATION: CPT | Mod: CQ

## 2024-03-06 PROCEDURE — 97110 THERAPEUTIC EXERCISES: CPT | Mod: CQ

## 2024-03-06 NOTE — PROGRESS NOTES
[facility]   Physical Therapy Treatment Note      Name: Maggie Pulliam  Clinic Number: [id number]    Therapy Diagnosis:   Encounter Diagnosis   Name Primary?    Complete tear of anterior cruciate ligament of knee, right, initial encounter Yes     Physician: Juan M Lopez MD     Visit Date: 3/6/2024    Physician Orders: PT Eval and Treat right acl repair  Medical Diagnosis from Referral: Right ACL repair  Evaluation Date: 2/15/2024  Plan of Care Expiration: 4/10/24  Progress Note Due: 4/10/24  Date of Surgery: 1/16/24  Visit # 7/25  Visits authorized: 25   FOTO: 2/3    PTA Visit #: 1/5     Time In:1110  Time Out: 1150  Total Billable Time: 40 minutes    Subjective     Pt reports: no complaints at this time  She was compliant with home exercise program.  Response to previous treatment: good  Functional change: none    Pain: 0/10  Location: right knee      Objective      3/5/24  Knee Left active Left Passive Right Active R passive   Flexion 141 141 115 118   Extension 0 0 0 0      3/5/24  -5 degree extension lag with SLR on right LE.         Lower Extremity Strength 3/5/24  Right LE   Left LE     Knee extension: 3-/5 Knee extension: 5/5   Knee flexion: 3-/5 Knee flexion: 5/5   Hip flexion: 4+/5 Hip flexion: 5/5   Hip extension:  4+/5 Hip extension: 5/5   Hip abduction: 4+/5 Hip abduction: 5/5   Hip adduction: 5/5 Hip adduction 5/5   Ankle dorsiflexion: 5/5 Ankle dorsiflexion: 5/5   Ankle plantarflexion: 5/5 Ankle plantarflexion: 5/5      Girth measurements 6 inches above right patella: 43 cm (16.9 inches)       Treatment     Maggie received the treatments listed below:    -Protocol Weeks 8-11  3/4 to-4/2  Begin treadmill walking on next visit  therapeutic exercises to develop strength, ROM, and flexibility to Right Lower extremity for 30 minutes including:  Quad sets 2 x 12 with small bolster under ankle  SAQ 3 x 10 on large bolster 1.5#  Standing 4 way 2 x 10 green band  SLR, side raises ,  hip adduction, hip extension, hs curls  2 x 10 1.5#  lunges straight; lunges side 2 x 10 each   wall squats with knee ball 2 x 10  Slant board stretch 3 x 30s/h  Lateral side steps x 2 up and down with red band     Not Today -Knee flexion stretch with strap 10 x 10s/h with ankle pump at end range in long sitting - not today  Hamstring stretch 5 x 10s/h -Not today  Seated heel slides 2 x 10 with 3s/h at end range Not today  Seated hip adduction 2 x 20 3s/h - not today       Balance and proprioception (10min)  bosu ball 2 x 60 sec   Right SLS 2 x 60 - with ball toss  Right SLS foam pad 2 x 60     Not Today - Knee flexion stretch with strap 10 x 10s/h with ankle pump at end range in long sitting - not today  Hamstring stretch 5 x 10s/h -Not today  Seated heel slides 2 x 10 with 3s/h at end range Not today  Seated hip adduction 2 x 20 3s/h - not today     Not Today - neuromuscular re-education activities to improve: Balance, Coordination, Kinesthetic, and Proprioception to right knee for 15 minutes. The following activities were included:  Standing in // bars patient performed Heel raises x 30 , R SLR,Hip abduction, hip adduction, hip extension with red band hs curls, mini squats on zari ball 2 x 10       Patient Education and Home Exercises       Education provided:   -  Maggie  received verbal and tactile cues to facilitate proper execution of exercises and body mechanics.    Written Home Exercises Provided: Patient instructed to cont prior HEP.     Assessment   Maggie continues to progress with therapy. She will be out of school and returning home for spring break next week. FOTO was taken today and she scored a 69/100 which is improvement from last FOTO. She returns home to see Ortho Monday of next week.     Maggie is a 18 y.o. female referred to outpatient Physical Therapy with a medical diagnosis of right ACL repair. Patient presents with limited ROM, decreased strength, dependence on brace and crutch, poor quad  control and need for skilled PT.     Maggie Is progressing well towards her goals.   Pt prognosis is Good.     Pt will continue to benefit from skilled outpatient physical therapy to address the deficits listed in the problem list box on initial evaluation, provide pt/family education and to maximize pt's level of independence in the home and community environment.     Pt's spiritual, cultural and educational needs considered and pt agreeable to plan of care and goals.     Anticipated barriers to physical therapy: none      Short Term Goals: 4 weeks   Pt will increase right knee flexion to 115, knee extension to 0 degrees, and quad lag to 0 degrees to allow patient normal gait pattern. GOAL MET  Pt will increase right knee strength to 3+/5 to allow patient to safely return to prior level of function   3.  Pt will report decrease in pain to 2/10 to improve quality of life GOAL MET  4.  Patient will ambulate 150 feet independently without AD with no deviation Goal Met.        Long Term Goals: 8 weeks   Pt will increase right knee flexion to 125, knee extension to 0 degrees, and quad lag to 0 degrees to allow patient normal gait pattern.  Pt will increase right knee strength to 4+/5 to allow patient to safely return to prior level of function   3.  Pt will report decrease in pain to 1/10 to improve quality of life  4.  Patient will ascend and descend 4 steps with no rail independently alternating steps independently without deviation   5. Patient will run on level surface x 100 feet with no deviation for return to sports.     Plan     Outpatient Physical Therapy 3 times weekly for 8 weeks to include the following interventions: Electrical Stimulation NMES, Gait Training, Manual Therapy, Moist Heat/ Ice, Neuromuscular Re-ed, Patient Education, Therapeutic Activities, and Therapeutic Exercise.     Patricio Chris, PTA

## 2024-03-20 ENCOUNTER — CLINICAL SUPPORT (OUTPATIENT)
Dept: REHABILITATION | Facility: HOSPITAL | Age: 19
End: 2024-03-20
Payer: COMMERCIAL

## 2024-03-20 DIAGNOSIS — S83.511A COMPLETE TEAR OF ANTERIOR CRUCIATE LIGAMENT OF KNEE, RIGHT, INITIAL ENCOUNTER: Primary | ICD-10-CM

## 2024-03-20 PROCEDURE — 97110 THERAPEUTIC EXERCISES: CPT | Mod: CQ

## 2024-03-20 PROCEDURE — 97112 NEUROMUSCULAR REEDUCATION: CPT | Mod: CQ

## 2024-03-20 NOTE — PROGRESS NOTES
"[facility]   Physical Therapy Treatment Note      Name: Maggie Pulliam  Clinic Number: [id number]    Therapy Diagnosis:   Encounter Diagnosis   Name Primary?    Complete tear of anterior cruciate ligament of knee, right, initial encounter Yes     Physician: Juan M Lopez MD     Visit Date: 3/20/2024    Physician Orders: PT Eval and Treat right acl repair  Medical Diagnosis from Referral: Right ACL repair  Evaluation Date: 2/15/2024  Plan of Care Expiration: 4/10/24  Progress Note Due: 4/10/24  Date of Surgery: 1/16/24  Visit # 8/25  Visits authorized: 25   FOTO: 2/3    PTA Visit #: 1/5     Time In:1017  Time Out: 1056  Total Billable Time: 39 minutes    Subjective     Pt reports:"its the best it has felt since surgery"  She was compliant with home exercise program.  Response to previous treatment: good  Functional change: none    Pain: 0/10  Location: right knee      Objective      3/5/24  Knee Left active Left Passive Right Active R passive   Flexion 141 141 115 118   Extension 0 0 0 0      3/5/24  -5 degree extension lag with SLR on right LE.         Lower Extremity Strength 3/5/24  Right LE   Left LE     Knee extension: 3-/5 Knee extension: 5/5   Knee flexion: 3-/5 Knee flexion: 5/5   Hip flexion: 4+/5 Hip flexion: 5/5   Hip extension:  4+/5 Hip extension: 5/5   Hip abduction: 4+/5 Hip abduction: 5/5   Hip adduction: 5/5 Hip adduction 5/5   Ankle dorsiflexion: 5/5 Ankle dorsiflexion: 5/5   Ankle plantarflexion: 5/5 Ankle plantarflexion: 5/5      Girth measurements 6 inches above right patella: 43 cm (16.9 inches)       Treatment     Maggie received the treatments listed below:    -Protocol Weeks 8-11  3/4 to-4/2  Begin treadmill walking on next visit  therapeutic exercises to develop strength, ROM, and flexibility to Right Lower extremity for 31 minutes including:  Quad sets with small bolster under ankle x 2 min  SAQ 3 x 10 on large bolster 2#  Standing 4 way 2 x 10 green band 2 x 10 "   Supine SLR, hip abduction, hip extension, hip adduction, hs curls 2 x 10 2#  lunges straight; lunges side 2 x 10 each   wall squats with knee ball 2 x 10  Slant board stretch 3 x 30s/h  Lateral side step with squat x 2 up and down with red band     Not Today -Knee flexion stretch with strap 10 x 10s/h with ankle pump at end range in long sitting - not today  Hamstring stretch 5 x 10s/h -Not today  Seated heel slides 2 x 10 with 3s/h at end range Not today  Seated hip adduction 2 x 20 3s/h - not today       Balance and proprioception (8min)  bosu ball 2 x 60 sec   Right SLS 2 x 60 - with ball toss  Right SL heel raise 2 x 10    Not Today - neuromuscular re-education activities to improve: Balance, Coordination, Kinesthetic, and Proprioception to right knee for 15 minutes. The following activities were included:  Standing in // bars patient performed Heel raises x 30 , R SLR,Hip abduction, hip adduction, hip extension with red band hs curls, mini squats on zari ball 2 x 10       Patient Education and Home Exercises       Education provided:   -  Maggie  received verbal and tactile cues to facilitate proper execution of exercises and body mechanics.    Written Home Exercises Provided: Patient instructed to cont prior HEP.     Assessment   Maggie continues to progress with therapy. She will be out of school and returning home for spring break next week. FOTO was taken today and she scored a 69/100 which is improvement from last FOTO. She returns home to see Ortho Monday of next week.     Maggie is a 18 y.o. female referred to outpatient Physical Therapy with a medical diagnosis of right ACL repair. Patient presents with limited ROM, decreased strength, dependence on brace and crutch, poor quad control and need for skilled PT.     Maggie Is progressing well towards her goals.   Pt prognosis is Good.     Pt will continue to benefit from skilled outpatient physical therapy to address the deficits listed in the problem list  box on initial evaluation, provide pt/family education and to maximize pt's level of independence in the home and community environment.     Pt's spiritual, cultural and educational needs considered and pt agreeable to plan of care and goals.     Anticipated barriers to physical therapy: none      Short Term Goals: 4 weeks   Pt will increase right knee flexion to 115, knee extension to 0 degrees, and quad lag to 0 degrees to allow patient normal gait pattern. GOAL MET  Pt will increase right knee strength to 3+/5 to allow patient to safely return to prior level of function   3.  Pt will report decrease in pain to 2/10 to improve quality of life GOAL MET  4.  Patient will ambulate 150 feet independently without AD with no deviation Goal Met.        Long Term Goals: 8 weeks   Pt will increase right knee flexion to 125, knee extension to 0 degrees, and quad lag to 0 degrees to allow patient normal gait pattern.  Pt will increase right knee strength to 4+/5 to allow patient to safely return to prior level of function   3.  Pt will report decrease in pain to 1/10 to improve quality of life  4.  Patient will ascend and descend 4 steps with no rail independently alternating steps independently without deviation   5. Patient will run on level surface x 100 feet with no deviation for return to sports.     Plan     Outpatient Physical Therapy 3 times weekly for 8 weeks to include the following interventions: Electrical Stimulation NMES, Gait Training, Manual Therapy, Moist Heat/ Ice, Neuromuscular Re-ed, Patient Education, Therapeutic Activities, and Therapeutic Exercise.     Patricio Chris, PTA

## 2024-03-25 ENCOUNTER — CLINICAL SUPPORT (OUTPATIENT)
Dept: REHABILITATION | Facility: HOSPITAL | Age: 19
End: 2024-03-25
Payer: COMMERCIAL

## 2024-03-25 DIAGNOSIS — S83.511A COMPLETE TEAR OF ANTERIOR CRUCIATE LIGAMENT OF KNEE, RIGHT, INITIAL ENCOUNTER: Primary | ICD-10-CM

## 2024-03-25 PROCEDURE — 97112 NEUROMUSCULAR REEDUCATION: CPT | Mod: CQ

## 2024-03-25 PROCEDURE — 97110 THERAPEUTIC EXERCISES: CPT | Mod: CQ

## 2024-03-25 NOTE — PROGRESS NOTES
[facility]   Physical Therapy Treatment Note      Name: Maggie Pulliam  [facility] Number: [id number]    Therapy Diagnosis:   Encounter Diagnosis   Name Primary?    Complete tear of anterior cruciate ligament of knee, right, initial encounter Yes     Physician: Juan M Lopez MD     Visit Date: 3/25/2024    Physician Orders: PT Eval and Treat right acl repair  Medical Diagnosis from Referral: Right ACL repair  Evaluation Date: 2/15/2024  Plan of Care Expiration: 4/10/24  Progress Note Due: 4/10/24  Date of Surgery: 1/16/24  Visit # 9/25  Visits authorized: 25   FOTO: 2/3    PTA Visit #: 2/5     Time In:1013  Time Out: 1103  Total Billable Time: 50 minutes    Subjective     Pt reports: no pain during this time   She was compliant with home exercise program.  Response to previous treatment: good  Functional change: none    Pain: 0/10  Location: right knee      Objective      3/25/24  Knee Left active Left Passive Right Active R passive   Flexion 141 141 128 130   Extension 0 0 0 0      3/25/24  -2 degree extension lag with SLR on right LE.      Lower Extremity Strength 3/25/24  Right LE   Left LE     Knee extension: 3/5 Knee extension: 5/5   Knee flexion: 4/5 Knee flexion: 5/5   Hip flexion: 4+/5 Hip flexion: 5/5   Hip extension:  4+/5 Hip extension: 5/5   Hip abduction: 4+/5 Hip abduction: 5/5   Hip adduction: 5/5 Hip adduction 5/5   Ankle dorsiflexion: 5/5 Ankle dorsiflexion: 5/5   Ankle plantarflexion: 5/5 Ankle plantarflexion: 5/5      Girth measurements 6 inches above right patella: 43 cm (16.9 inches)       Treatment     -Protocol Weeks 8-11  3/4 to-4/2  Maggie received the treatments listed below:    Therapeutic Exercises to develop strength, ROM, and flexibility to Right Lower extremity for 35 minutes including:  Treadmill walking 2.0 mph x 5 minutes   Quad sets with small bolster under ankle x 2 min  SAQ 3 x 10 on large bolster 2#  TKE with green band 2 x 10   Standing 4 way blue band 2  "x 10   Supine SLR, hip abduction, hip extension, hip adduction, hs curls 2 x 15 2#  lunges straight; lunges side, reverse lunges on 6" step 2 x 10 each   wall squats with knee ball 2 x 10  Slant board stretch 3 x 30s/h - not today  Lateral side step with squat x 2 up and down with red band       Not Today -Knee flexion stretch with strap 10 x 10s/h with ankle pump at end range in long sitting - not today  Hamstring stretch 5 x 10s/h -Not today  Seated heel slides 2 x 10 with 3s/h at end range Not today  Seated hip adduction 2 x 20 3s/h - not today       Balance and proprioception (15min)  bosu ball 2 x 60 sec with manual perturbation  Right SLS 2 x 60 - with ball toss  Right SL heel raise 2 x 10    Not Today - neuromuscular re-education activities to improve: Balance, Coordination, Kinesthetic, and Proprioception to right knee for 15 minutes. The following activities were included:  Standing in // bars patient performed Heel raises x 30 , R SLR,Hip abduction, hip adduction, hip extension with red band hs curls, mini squats on zari ball 2 x 10       Patient Education and Home Exercises       Education provided:   -  Maggie  received verbal and tactile cues to facilitate proper execution of exercises and body mechanics.    Written Home Exercises Provided: Patient instructed to cont prior HEP.     Assessment   Maggie continues to progress with therapy. She states this is the most normal she has felt since surgery.    Maggie is a 18 y.o. female referred to outpatient Physical Therapy with a medical diagnosis of right ACL repair. Patient presents with limited ROM, decreased strength, dependence on brace and crutch, poor quad control and need for skilled PT.     Maggie Is progressing well towards her goals.   Pt prognosis is Good.     Pt will continue to benefit from skilled outpatient physical therapy to address the deficits listed in the problem list box on initial evaluation, provide pt/family education and to maximize " pt's level of independence in the home and community environment.     Pt's spiritual, cultural and educational needs considered and pt agreeable to plan of care and goals.     Anticipated barriers to physical therapy: none      Short Term Goals: 4 weeks   Pt will increase right knee flexion to 115, knee extension to 0 degrees, and quad lag to 0 degrees to allow patient normal gait pattern. GOAL MET  Pt will increase right knee strength to 3+/5 to allow patient to safely return to prior level of function   3.  Pt will report decrease in pain to 2/10 to improve quality of life GOAL MET  4.  Patient will ambulate 150 feet independently without AD with no deviation Goal Met.         Long Term Goals: 8 weeks   Pt will increase right knee flexion to 125, knee extension to 0 degrees, and quad lag to 0 degrees to allow patient normal gait pattern. Partially met  Pt will increase right knee strength to 4+/5 to allow patient to safely return to prior level of function   3.  Pt will report decrease in pain to 1/10 to improve quality of life  4.  Patient will ascend and descend 4 steps with no rail independently alternating steps independently without deviation   5. Patient will run on level surface x 100 feet with no deviation for return to sports.     Plan     Outpatient Physical Therapy 3 times weekly for 8 weeks to include the following interventions: Electrical Stimulation NMES, Gait Training, Manual Therapy, Moist Heat/ Ice, Neuromuscular Re-ed, Patient Education, Therapeutic Activities, and Therapeutic Exercise.     Patricio Chris, PTA

## 2024-04-01 NOTE — PROGRESS NOTES
"  [facility]   Physical Therapy Treatment Note     Name: Maggie Pulliam  Clinic Number: [id number]    Therapy Diagnosis: Complete tear of anterior cruciate ligament of knee, right  Physician: Juan M Lopez MD    Visit Date: 4/3/2024     Physician Orders: PT Eval and Treat right acl repair  Medical Diagnosis from Referral: Right ACL repair  Evaluation Date: 2/15/2024  Plan of Care Expiration: 4/10/24  Progress Note Due: 4/10/24  Date of Surgery: 1/16/24  Visits authorized: 9 /24  FOTO: 2/3    Time In: 1:05 pm  Time Out: 2:00 pm   Total Billable Time: 55 minutes    Precautions: Standard  Functional Level Prior to Evaluation: Patient was playing 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    Subjective     Pt reports: that she is ready to work hard.  She was compliant with home exercise program.    Pain: 0/10  Location: right knee      Objective       Maggie received therapeutic exercises to develop strength, endurance, ROM, and flexibility for 25 minutes including:    Knee Exercises      Bike/Elliptical/TM  5 minutes bike   Calf Stretch  4 x 15 seconds    Hamstring Stretch  4 x 15 seconds    Quad Stretch    Cybex Leg Press - Bilateral 3 x 10 8 plates   Cybex Leg Press - Single  3 x 10 4 plates   Cybex Knee Extension  2 x 10 2 plates bilateral and  x 10 1 plate right    Cybex Hamstring Curls 3 x 10 4 plates   Cybex Hip - Abduction    Cybex Hip - Flexion     Cybex Hip - Extension                   Neuro-re-ed x 20 minutes  Lateral step downs 6" 3 x 10  Forward step downs 6" 2 x 10  Walking lunges 2 laps  Vietnamese split squats 2 x 10   Cable TKE's  Bosu squats  SINGLE LEG STANCE on foam with ball toss    Therapeutic Activity x 10 minutes  Trap bar dead lifts 2 x 10 75#  Straight-leg dead lifts 2 x 10 15#  Barbell squats  Box jumps  Straight line jogging         Home Exercises Provided and Patient Education Provided     Education provided: HOME EXERCISE PROGRAM     Written Home Exercises " "Provided: Patient instructed to cont prior HEP.  Exercises were reviewed and Maggie was able to demonstrate them prior to the end of the session.  Maggie demonstrated good  understanding of the education provided.     See EMR under Patient Instructions for exercises provided prior visit.    Assessment     Patient has transferred from one of our outlying therapy departments. Patient's first treatment today in new facility. Upgraded PLAN OF CARE to prepare patient for passing return to sport testing. Current passive range of motion is 0-140 degrees. Will focus on aggressive strengthening for few weeks then add agility work with appropriate progression. Patient continues to have significant VMO weakness. Patient is motivated and works hard in therapy. Will progress patient toward passing the return to sport testing.          Maggie Is progressing well towards her goals.   Pt prognosis is Good.     Pt will continue to benefit from skilled outpatient physical therapy to address the deficits listed in the problem list box on initial evaluation, provide pt/family education and to maximize pt's level of independence in the home and community environment.     Pt's spiritual, cultural and educational needs considered and pt agreeable to plan of care and goals.     Anticipated barriers to physical therapy: None    Goals:  Short Term Goals: 8 weeks   1. Independent with Home Exercise Program : Met  2. Increase Right Knee Active Range of Motion to 0 Degrees to 120 Degrees : Met  3. Increase Right Knee Strength to grossly 4/5  4. Patient will ambulate 500 feet with Normal Gait pattern with complaints of pain Less than or Equal to 2/10. Met    Long Term Goals: 16  weeks   1. Patient will increase Right Knee Strength to grossly 5/5  2. Patient will jog with no pain or deviations  3. Patient will perform 18" box jump with no pain or instability  4. Patient will score 60% or greater on return to sport testing        Plan     Plan of " care Certification: 2/15/2024 to 6/6/2024.     Outpatient Physical Therapy 2 times weekly for 16 weeks to include the following interventions: GameReady, Electrical Stimulation Maltese/pre-mod, Manual Therapy, Moist Heat/ Ice, Neuromuscular Re-ed, Patient Education, Therapeutic Activities, and Therapeutic Exercise.     KAREN JAFFE, PT  4/3/2024

## 2024-04-03 ENCOUNTER — CLINICAL SUPPORT (OUTPATIENT)
Dept: REHABILITATION | Facility: HOSPITAL | Age: 19
End: 2024-04-03
Payer: COMMERCIAL

## 2024-04-03 DIAGNOSIS — Z98.890 S/P RECONSTRUCTION OF ACL OF RIGHT KNEE USING BONE-PATELLAR TENDON-BONE AUTOGRAFT: Primary | ICD-10-CM

## 2024-04-03 PROCEDURE — 97112 NEUROMUSCULAR REEDUCATION: CPT

## 2024-04-03 PROCEDURE — 97110 THERAPEUTIC EXERCISES: CPT

## 2024-04-03 PROCEDURE — 97530 THERAPEUTIC ACTIVITIES: CPT

## 2024-04-08 ENCOUNTER — CLINICAL SUPPORT (OUTPATIENT)
Dept: REHABILITATION | Facility: HOSPITAL | Age: 19
End: 2024-04-08
Payer: COMMERCIAL

## 2024-04-08 DIAGNOSIS — Z98.890 S/P ACL REPAIR: Primary | ICD-10-CM

## 2024-04-08 PROCEDURE — 97112 NEUROMUSCULAR REEDUCATION: CPT

## 2024-04-08 PROCEDURE — 97110 THERAPEUTIC EXERCISES: CPT

## 2024-04-08 PROCEDURE — 97530 THERAPEUTIC ACTIVITIES: CPT

## 2024-04-08 NOTE — PROGRESS NOTES
"  [facility]   Physical Therapy Treatment Note     Name: Maggie Pulliam  Clinic Number: [id number]    Therapy Diagnosis: Complete tear of anterior cruciate ligament of knee, right  Physician: Juan M Lopez MD    Visit Date: 4/8/2024     Physician Orders: PT Eval and Treat right acl repair  Medical Diagnosis from Referral: Right ACL repair  Evaluation Date: 2/15/2024  Plan of Care Expiration: 4/10/24  Progress Note Due: 4/10/24  Date of Surgery: 1/16/24  Visits authorized: 11 /24  FOTO: 2/3    Time In: 9:00 am  Time Out: 10:00 am   Total Billable Time: 55 minutes    Precautions: Standard  Functional Level Prior to Evaluation: Patient was playing 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    Subjective     Pt reports: some glute soreness after last treatment.   She was compliant with home exercise program.    Pain: 0/10  Location: right knee      Objective       Maggie received therapeutic exercises to develop strength, endurance, ROM, and flexibility for 25 minutes including:    Knee Exercises      Bike/Elliptical/TM  5 minutes elliptical    Calf Stretch  4 x 15 seconds    Hamstring Stretch  4 x 15 seconds    Quad Stretch    Cybex Leg Press - Bilateral 3 x 10 8 plates   Cybex Leg Press - Single  3 x 10 4 plates   Cybex Knee Extension  2 x 10 2 plates bilateral    Cybex Hamstring Curls 3 x 10 5 plates   Cybex Hip - Abduction    Cybex Hip - Flexion     Cybex Hip - Extension                   Neuro-re-ed x 20 minutes  Lateral step downs 6" 3 x 10  Forward step downs 6" 3 x 10  Walking lunges 2 laps  Hong Konger split squats 2 x 10   Cable TKE's 2 x 10 4 plates with 3 second hold  Bosu squats  SINGLE LEG STANCE on foam with ball toss    Therapeutic Activity x 10 minutes  Trap bar dead lifts 2 x 10 75#  Straight-leg dead lifts 2 x 10 15#  Barbell squats 2 x 10 45#  Box jumps  Straight line jogging         Home Exercises Provided and Patient Education Provided     Education provided: HOME " "EXERCISE PROGRAM     Written Home Exercises Provided: Patient instructed to cont prior HEP.  Exercises were reviewed and Maggie was able to demonstrate them prior to the end of the session.  Maggie demonstrated good  understanding of the education provided.     See EMR under Patient Instructions for exercises provided prior visit.    Assessment     Increased reps on forward step downs, added cable TKE's and barbell squats. Increased weight on Cybex hamstring curls. Patient is doing well with new exercises. Will continue to advance patient toward goal of returning to full participation with basketball team.                Maggie Is progressing well towards her goals.   Pt prognosis is Good.     Pt will continue to benefit from skilled outpatient physical therapy to address the deficits listed in the problem list box on initial evaluation, provide pt/family education and to maximize pt's level of independence in the home and community environment.     Pt's spiritual, cultural and educational needs considered and pt agreeable to plan of care and goals.     Anticipated barriers to physical therapy: None    Goals:  Short Term Goals: 8 weeks   1. Independent with Home Exercise Program : Met  2. Increase Right Knee Active Range of Motion to 0 Degrees to 120 Degrees : Met  3. Increase Right Knee Strength to grossly 4/5  4. Patient will ambulate 500 feet with Normal Gait pattern with complaints of pain Less than or Equal to 2/10. Met    Long Term Goals: 16  weeks   1. Patient will increase Right Knee Strength to grossly 5/5  2. Patient will jog with no pain or deviations  3. Patient will perform 18" box jump with no pain or instability  4. Patient will score 60% or greater on return to sport testing        Plan     Plan of care Certification: 2/15/2024 to 6/6/2024.     Outpatient Physical Therapy 2 times weekly for 16 weeks to include the following interventions: GameReady, Electrical Stimulation Spanish/pre-mod, Manual " Therapy, Moist Heat/ Ice, Neuromuscular Re-ed, Patient Education, Therapeutic Activities, and Therapeutic Exercise.     KAREN JAFFE, PT  4/8/2024

## 2024-04-15 ENCOUNTER — CLINICAL SUPPORT (OUTPATIENT)
Dept: REHABILITATION | Facility: HOSPITAL | Age: 19
End: 2024-04-15
Payer: COMMERCIAL

## 2024-04-15 DIAGNOSIS — Z98.890 S/P ACL REPAIR: Primary | ICD-10-CM

## 2024-04-15 PROCEDURE — 97110 THERAPEUTIC EXERCISES: CPT

## 2024-04-15 PROCEDURE — 97112 NEUROMUSCULAR REEDUCATION: CPT

## 2024-04-15 PROCEDURE — 97530 THERAPEUTIC ACTIVITIES: CPT

## 2024-04-15 NOTE — PROGRESS NOTES
"  [facility]   Physical Therapy Treatment Note     Name: Maggie Pulliam  Clinic Number: [id number]    Therapy Diagnosis: Complete tear of anterior cruciate ligament of knee, right  Physician: Juan M Lopez MD    Visit Date: 4/15/2024     Physician Orders: PT Eval and Treat right acl repair  Medical Diagnosis from Referral: Right ACL repair  Evaluation Date: 2/15/2024  Plan of Care Expiration: 4/10/24  Progress Note Due: 4/10/24  Date of Surgery: 1/16/24  Visits authorized: 12 /24  FOTO: 2/3    Time In: 3:33 pm  Time Out: 4:40 pm   Total Billable Time: 63 minutes    Precautions: Standard  Functional Level Prior to Evaluation: Patient was playing 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    Subjective     Pt reports: good visit with doctor today. Patient reports that knee is little stiff from her drive from [place] today.  She was compliant with home exercise program.    Pain: 0/10  Location: right knee      Objective       Maggie received therapeutic exercises to develop strength, endurance, ROM, and flexibility for 25 minutes including:    Knee Exercises      Bike/Elliptical/TM  5 minutes bike   Calf Stretch  4 x 15 seconds    Hamstring Stretch  4 x 15 seconds    Quad Stretch    Cybex Leg Press - Bilateral 3 x 10 8 plates   Cybex Leg Press - Single  3 x 10 4 plates   Cybex Knee Extension  2 x 10 2 plates bilateral    Cybex Hamstring Curls bilateral and single 3 x 10 6 plates/ 3 x 10 3 plates   Cybex Hip - Abduction 2 x 10 3 plates   Cybex Hip - Flexion     Cybex Hip - Extension                   Neuro-re-ed x 23 minutes  Lateral step downs 6" 3 x 10  Forward step downs 6" 3 x 10  Walking lunges 3 laps  Italian split squats 3 x 10   Cable TKE's 2 x 10 4 plates with 3 second hold  Bosu squats 2 x 10  SINGLE LEG STANCE on foam with ball toss    Therapeutic Activity x 15 minutes  Trap bar dead lifts 2 x 10 75#  Straight-leg dead lifts 3 x 10 25#  Box jumps  Straight line jogging   Cybex " hack squats 3 x 10 70#  Cybex hack calf raises 3 x 10 70#      Home Exercises Provided and Patient Education Provided     Education provided: HOME EXERCISE PROGRAM     Written Home Exercises Provided: Patient instructed to cont prior HEP.  Exercises were reviewed and Maggie was able to demonstrate them prior to the end of the session.  Maggie demonstrated good  understanding of the education provided.     See EMR under Patient Instructions for exercises provided prior visit.    Assessment     Patient reports that she had good follow-up with Orthopedic surgeon this morning. Patient is cleared for light jogging. Instructed patient to bring the brace her doctor gave her to next visit and will perform some straight line jogging. Increased reps on walking lunges, Maltese split squats and trap bar dead lifts. Added Cybex hack squats, calf raises, single-leg hamstring curls, Bosu ball squats and hip abduction. Increased weight on Cybex hamstring curls and straight-leg dead lifts. Patient is highly motivated and is making excellent progress.               Maggie Is progressing well towards her goals.   Pt prognosis is Good.     Pt will continue to benefit from skilled outpatient physical therapy to address the deficits listed in the problem list box on initial evaluation, provide pt/family education and to maximize pt's level of independence in the home and community environment.     Pt's spiritual, cultural and educational needs considered and pt agreeable to plan of care and goals.     Anticipated barriers to physical therapy: None    Goals:  Short Term Goals: 8 weeks   1. Independent with Home Exercise Program : Met  2. Increase Right Knee Active Range of Motion to 0 Degrees to 120 Degrees : Met  3. Increase Right Knee Strength to grossly 4/5  4. Patient will ambulate 500 feet with Normal Gait pattern with complaints of pain Less than or Equal to 2/10. Met    Long Term Goals: 16  weeks   1. Patient will increase Right  "Knee Strength to grossly 5/5  2. Patient will jog with no pain or deviations  3. Patient will perform 18" box jump with no pain or instability  4. Patient will score 60% or greater on return to sport testing        Plan     Plan of care Certification: 2/15/2024 to 6/6/2024.     Outpatient Physical Therapy 2 times weekly for 16 weeks to include the following interventions: GameReady, Electrical Stimulation Liberian/pre-mod, Manual Therapy, Moist Heat/ Ice, Neuromuscular Re-ed, Patient Education, Therapeutic Activities, and Therapeutic Exercise.     KAREN JAFFE, PT  4/15/2024           "

## 2024-04-17 ENCOUNTER — CLINICAL SUPPORT (OUTPATIENT)
Dept: REHABILITATION | Facility: HOSPITAL | Age: 19
End: 2024-04-17
Payer: COMMERCIAL

## 2024-04-17 DIAGNOSIS — S83.511D COMPLETE TEAR OF ANTERIOR CRUCIATE LIGAMENT OF KNEE, RIGHT, SUBSEQUENT ENCOUNTER: Primary | ICD-10-CM

## 2024-04-17 PROCEDURE — 97110 THERAPEUTIC EXERCISES: CPT

## 2024-04-17 PROCEDURE — 97530 THERAPEUTIC ACTIVITIES: CPT

## 2024-04-17 PROCEDURE — 97016 VASOPNEUMATIC DEVICE THERAPY: CPT

## 2024-04-17 PROCEDURE — 97112 NEUROMUSCULAR REEDUCATION: CPT

## 2024-04-17 NOTE — PROGRESS NOTES
"  [facility]   Physical Therapy Treatment Note     Name: Maggie Pulliam  Clinic Number: [id number]    Therapy Diagnosis: Complete tear of anterior cruciate ligament of knee, right  Physician: Juan M Lopez MD    Visit Date: 4/17/2024     Physician Orders: PT Eval and Treat right acl repair  Medical Diagnosis from Referral: Right ACL repair  Evaluation Date: 2/15/2024  Plan of Care Expiration: 4/10/24  Progress Note Due: 4/10/24  Date of Surgery: 1/16/24  Visits authorized: 13 /24  FOTO: 2/3    Time In: 1:00 pm  Time Out: 2:10  pm   Total Billable Time: 70 minutes    Precautions: Standard  Functional Level Prior to Evaluation: Patient was playing 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    Subjective     Pt reports: feeling good today  She was compliant with home exercise program.    Pain: 0/10  Location: right knee      Objective       Maggie received therapeutic exercises to develop strength, endurance, ROM, and flexibility for 20 minutes including:    Knee Exercises      Bike/Elliptical/TM  3 minutes bike   Calf Stretch  4 x 15 seconds    Hamstring Stretch  4 x 15 seconds    Quad Stretch    Cybex Leg Press - Bilateral 3 x 10 8 plates   Cybex Leg Press - Single  3 x 10 4 plates   Cybex Knee Extension 3 x 10 2 plates bilateral    Cybex Hamstring Curls bilateral and single 3 x 10 6 plates/ 3 x 10 3 plates   Cybex Hip - Abduction 2 x 10 3 plates   Cybex Hip - Flexion     Cybex Hip - Extension                   Neuro-re-ed x 25 minutes  Lateral step downs 6" 3 x 10  Forward step downs 6" 3 x 10  Walking lunges 3 laps  Pashto split squats 3 x 10   Cable TKE's 2 x 10 5 plates with 3 second hold  Bosu squats 2 x 10  SINGLE LEG STANCE on foam with ball toss x 30 throws    Therapeutic Activity x 10 minutes  Trap bar dead lifts 2 x 10 75#  Straight-leg dead lifts 3 x 10 25#  Barbell squats 2 x 10 45#  Box jumps  Straight line jogging   Modalities x 15 minutes  GameReady      Home Exercises " "Provided and Patient Education Provided     Education provided: HOME EXERCISE PROGRAM     Written Home Exercises Provided: Patient instructed to cont prior HEP.  Exercises were reviewed and Maggie was able to demonstrate them prior to the end of the session.  Maggie demonstrated good  understanding of the education provided.     See EMR under Patient Instructions for exercises provided prior visit.    Assessment     Patient is 13 weeks post-op.  Added single-leg on foam with ball toss today. Patient demonstrated improved eccentric control with step downs and improved performance of trap bar dead lifts. Patient requires cues for equal distribution of weight during squats. Increased weight on straight-leg dead lifts, cable TKE's and reps on knee extensions. Patient continues to have some edema so performed GameReady at end of treatment. Will continue to advance patient as tolerated.        Maggie Is progressing well towards her goals.   Pt prognosis is Good.     Pt will continue to benefit from skilled outpatient physical therapy to address the deficits listed in the problem list box on initial evaluation, provide pt/family education and to maximize pt's level of independence in the home and community environment.     Pt's spiritual, cultural and educational needs considered and pt agreeable to plan of care and goals.     Anticipated barriers to physical therapy: None    Goals:  Short Term Goals: 8 weeks   1. Independent with Home Exercise Program : Met  2. Increase Right Knee Active Range of Motion to 0 Degrees to 120 Degrees : Met  3. Increase Right Knee Strength to grossly 4/5  4. Patient will ambulate 500 feet with Normal Gait pattern with complaints of pain Less than or Equal to 2/10. Met    Long Term Goals: 16  weeks   1. Patient will increase Right Knee Strength to grossly 5/5  2. Patient will jog with no pain or deviations  3. Patient will perform 18" box jump with no pain or instability  4. Patient will score " 60% or greater on return to sport testing        Plan     Plan of care Certification: 2/15/2024 to 6/6/2024.     Outpatient Physical Therapy 2 times weekly for 16 weeks to include the following interventions: GameReady, Electrical Stimulation [place]an/pre-mod, Manual Therapy, Moist Heat/ Ice, Neuromuscular Re-ed, Patient Education, Therapeutic Activities, and Therapeutic Exercise.     KAREN JAFFE, PT  4/17/2024

## 2024-04-22 ENCOUNTER — CLINICAL SUPPORT (OUTPATIENT)
Dept: REHABILITATION | Facility: HOSPITAL | Age: 19
End: 2024-04-22
Payer: COMMERCIAL

## 2024-04-22 DIAGNOSIS — Z98.890 S/P ACL REPAIR: ICD-10-CM

## 2024-04-22 DIAGNOSIS — S83.511D COMPLETE TEAR OF ANTERIOR CRUCIATE LIGAMENT OF KNEE, RIGHT, SUBSEQUENT ENCOUNTER: Primary | ICD-10-CM

## 2024-04-22 PROCEDURE — 97530 THERAPEUTIC ACTIVITIES: CPT | Mod: CQ

## 2024-04-22 PROCEDURE — 97112 NEUROMUSCULAR REEDUCATION: CPT | Mod: CQ

## 2024-04-22 PROCEDURE — 97110 THERAPEUTIC EXERCISES: CPT | Mod: CQ

## 2024-04-22 NOTE — PROGRESS NOTES
"  [facility]   Physical Therapy Treatment Note     Name: Maggie Pulliam  Clinic Number: [id number]    Therapy Diagnosis: Complete tear of anterior cruciate ligament of knee, right  Physician: Juan M Lopez MD    Visit Date: 4/22/2024     Physician Orders: PT Eval and Treat right acl repair  Medical Diagnosis from Referral: Right ACL repair  Evaluation Date: 2/15/2024  Plan of Care Expiration: 4/10/24  Progress Note Due: 4/10/24  Date of Surgery: 1/16/24  Visits authorized: 13 /24  FOTO: 2/3    Time In: 11:24 am  Time Out: 12:18  pm   Total Billable Time: 54 minutes    Precautions: Standard  Functional Level Prior to Evaluation: Patient was playing 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    Subjective     Pt reports: feeling good today with no pain noted  She was compliant with home exercise program.    Pain: 0/10  Location: right knee      Objective       Maggie received therapeutic exercises to develop strength, endurance, ROM, and flexibility for 20 minutes including:    Knee Exercises      Bike/Elliptical/TM  5 minutes elliptical   Calf Stretch  4 x 15 seconds    Hamstring Stretch  4 x 15 seconds    Quad Stretch Standing 2x30 sec   Cybex Leg Press - Bilateral 3 x 10 8 plates   Cybex Leg Press - Single  3 x 10 4 plates   Cybex Knee Extension 2 x 10 3 plates bilateral  (circuit)   Cybex Hamstring Curls bilateral and single 3 x 10 6 plates/ 3 x 10 3 plates   Cybex Hip - Abduction 2 x 10 3 plates   Cybex Hip - Flexion     Cybex Hip - Extension                   Neuro-re-ed x 23 minutes  Lateral step downs 6" 3 x 10  Forward step downs 6" 3 x 10  TRX Step Ups, 8" 4x5  TRX Anterior Lunges 3x5x5 sec hold  Walking lunges 3 laps  Lao split squats 2 x 10 (circuit)  Bosu squats 2 x 10    Therapeutic Activity x 10 minutes  Trap bar dead lifts 2 x 10 75#  Straight-leg dead lifts 3 x 10 25#  Bike Sprints 4x30 seconds  Box jumps  Straight line jogging   GameReady      Home Exercises " "Provided and Patient Education Provided     Education provided: HOME EXERCISE PROGRAM     Written Home Exercises Provided: Patient instructed to cont prior HEP.  Exercises were reviewed and Maggie was able to demonstrate them prior to the end of the session.  Maggie demonstrated good  understanding of the education provided.     See EMR under Patient Instructions for exercises provided prior visit.    Assessment     Pt doing well and RANGE OF MOTION is WFL. Progressed LOWER EXTREMITY strengthening exercises with mild fatigue noted. Pt unable to perform single leg knee extensions from 0-90 or 45-90 degrees due to quad weakness. Pt reaches with L LOWER EXTREMITY during lateral step downs so put 2" step below her foot to help decrease that. Had pt perform increased weight B LOWER EXTREMITY knee extension and followed this with a split squat circuit. Pt demonstrates good dynamic stability on R LOWER EXTREMITY during exercises. Ended session with Bike Sprints for endurance. Educated pt to keep up with her HEP diligently.         Maggie Is progressing well towards her goals.   Pt prognosis is Good.     Pt will continue to benefit from skilled outpatient physical therapy to address the deficits listed in the problem list box on initial evaluation, provide pt/family education and to maximize pt's level of independence in the home and community environment.     Pt's spiritual, cultural and educational needs considered and pt agreeable to plan of care and goals.     Anticipated barriers to physical therapy: None    Goals:  Short Term Goals: 8 weeks   1. Independent with Home Exercise Program : Met  2. Increase Right Knee Active Range of Motion to 0 Degrees to 120 Degrees : Met  3. Increase Right Knee Strength to grossly 4/5  4. Patient will ambulate 500 feet with Normal Gait pattern with complaints of pain Less than or Equal to 2/10. Met    Long Term Goals: 16  weeks   1. Patient will increase Right Knee Strength to grossly " "5/5  2. Patient will jog with no pain or deviations  3. Patient will perform 18" box jump with no pain or instability  4. Patient will score 60% or greater on return to sport testing        Plan     Plan of care Certification: 2/15/2024 to 6/6/2024.     Outpatient Physical Therapy 2 times weekly for 16 weeks to include the following interventions: GameReady, Electrical Stimulation Monegasque/pre-mod, Manual Therapy, Moist Heat/ Ice, Neuromuscular Re-ed, Patient Education, Therapeutic Activities, and Therapeutic Exercise.     Stoney Willson, PTA  4/22/2024               "

## 2024-04-25 ENCOUNTER — CLINICAL SUPPORT (OUTPATIENT)
Dept: REHABILITATION | Facility: HOSPITAL | Age: 19
End: 2024-04-25
Payer: COMMERCIAL

## 2024-04-25 DIAGNOSIS — S83.511D COMPLETE TEAR OF ANTERIOR CRUCIATE LIGAMENT OF KNEE, RIGHT, SUBSEQUENT ENCOUNTER: Primary | ICD-10-CM

## 2024-04-25 PROCEDURE — 97530 THERAPEUTIC ACTIVITIES: CPT | Mod: CQ

## 2024-04-25 PROCEDURE — 97110 THERAPEUTIC EXERCISES: CPT | Mod: CQ

## 2024-04-25 PROCEDURE — 97112 NEUROMUSCULAR REEDUCATION: CPT | Mod: CQ

## 2024-04-25 NOTE — PROGRESS NOTES
"  [facility]   Physical Therapy Treatment Note     Name: Maggie Pulliam  Clinic Number: [id number]    Therapy Diagnosis: Complete tear of anterior cruciate ligament of knee, right  Physician: Juan M Lopez MD    Visit Date: 4/25/2024     Physician Orders: PT Eval and Treat right acl repair  Medical Diagnosis from Referral: Right ACL repair  Evaluation Date: 2/15/2024  Plan of Care Expiration: 4/10/24  Progress Note Due: 4/10/24  Date of Surgery: 1/16/24  Visits authorized: 13 /24  FOTO: 2/3    Time In: 1:00 pm  Time Out: 1:53  pm   Total Billable Time: 53 minutes    Precautions: Standard  Functional Level Prior to Evaluation: Patient was playing 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    Subjective     Pt reports: feeling good today; was a little under the weather yesterday  She was compliant with home exercise program.    Pain: 0/10  Location: right knee      Objective       Maggie received therapeutic exercises to develop strength, endurance, ROM, and flexibility for 23 minutes including:    Knee Exercises      Bike/Elliptical/TM  5 minutes elliptical   Calf Stretch  4 x 15 seconds    Hamstring Stretch  4 x 15 seconds    Quad Stretch Standing 2x30 sec   Cybex Leg Press - Bilateral 3 x 10 10 plates   Cybex Leg Press - Single  2 x 10 6 plates   Cybex Knee Extension 2 x 10 3 plates bilateral  (circuit)   Cybex Hamstring Curls bilateral and single 3 x 10 6 plates/ 3 x 10 3 plates   Cybex Hip - Abduction 2 x 10 3 plates   Cybex Hip - Flexion     Cybex Hip - Extension                   Neuro-re-ed x 23 minutes  Lateral step downs 4" 2 x 10  OKC Extension (30-90 degrees) 1 plate, 2 legs up and eccentric down, 20x   Forward step downs 6" 2 x 10  TRX Step Ups, 8" 4x5  Bosu squats 3 x 10    Therapeutic Activity x 8 minutes  Straight-leg dead lifts 3 x 10 25#  Box jumps  Straight line jogging   GameReady      Home Exercises Provided and Patient Education Provided     Education provided: HOME " "EXERCISE PROGRAM     Written Home Exercises Provided: Patient instructed to cont prior HEP.  Exercises were reviewed and Maggie was able to demonstrate them prior to the end of the session.  Maggie demonstrated good  understanding of the education provided.     See EMR under Patient Instructions for exercises provided prior visit.    Assessment     Pt doing well today with no pain noted. Pt doing well and tolerated lateral step downs with improved form. Still very weak into open chain extension but able to tolerate eccentrics today. Pt relies a lot on her glutes with CKC exercises due to quad weakness. Educated pt to keep up with her HEP diligently and to start back into the gym as shown today. Will continue to progress as able.         Maggie Is progressing well towards her goals.   Pt prognosis is Good.     Pt will continue to benefit from skilled outpatient physical therapy to address the deficits listed in the problem list box on initial evaluation, provide pt/family education and to maximize pt's level of independence in the home and community environment.     Pt's spiritual, cultural and educational needs considered and pt agreeable to plan of care and goals.     Anticipated barriers to physical therapy: None    Goals:  Short Term Goals: 8 weeks   1. Independent with Home Exercise Program : Met  2. Increase Right Knee Active Range of Motion to 0 Degrees to 120 Degrees : Met  3. Increase Right Knee Strength to grossly 4/5  4. Patient will ambulate 500 feet with Normal Gait pattern with complaints of pain Less than or Equal to 2/10. Met    Long Term Goals: 16  weeks   1. Patient will increase Right Knee Strength to grossly 5/5  2. Patient will jog with no pain or deviations  3. Patient will perform 18" box jump with no pain or instability  4. Patient will score 60% or greater on return to sport testing        Plan     Plan of care Certification: 2/15/2024 to 6/6/2024.     Outpatient Physical Therapy 2 times weekly " for 16 weeks to include the following interventions: GameReady, Electrical Stimulation Australian/pre-mod, Manual Therapy, Moist Heat/ Ice, Neuromuscular Re-ed, Patient Education, Therapeutic Activities, and Therapeutic Exercise.     Stoney Willson, PTA  4/25/2024

## 2024-04-30 ENCOUNTER — CLINICAL SUPPORT (OUTPATIENT)
Dept: REHABILITATION | Facility: HOSPITAL | Age: 19
End: 2024-04-30
Payer: COMMERCIAL

## 2024-04-30 DIAGNOSIS — S83.511D COMPLETE TEAR OF ANTERIOR CRUCIATE LIGAMENT OF KNEE, RIGHT, SUBSEQUENT ENCOUNTER: Primary | ICD-10-CM

## 2024-04-30 PROCEDURE — 97530 THERAPEUTIC ACTIVITIES: CPT | Mod: CQ

## 2024-04-30 PROCEDURE — 97110 THERAPEUTIC EXERCISES: CPT | Mod: CQ

## 2024-04-30 NOTE — PROGRESS NOTES
"  [facility]   Physical Therapy Treatment Note     Name: Maggie Pulliam  Clinic Number: [id number]    Therapy Diagnosis: Complete tear of anterior cruciate ligament of knee, right  Physician: Juan M Lopez MD    Visit Date: 4/30/2024     Physician Orders: PT Eval and Treat right acl repair  Medical Diagnosis from Referral: Right ACL repair  Evaluation Date: 2/15/2024  Plan of Care Expiration: 4/10/24  Progress Note Due: 4/10/24  Date of Surgery: 1/16/24  Visits authorized: 16 /24  PTA Visit: 3/5  FOTO: 2/3    Time In: 1:00 pm  Time Out: 1:58  pm   Total Billable Time: 32 billable minutes    Precautions: Standard  Functional Level Prior to Evaluation: Patient was playing 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    Subjective     Pt reports: feeling good today; was a little under the weather yesterday  She was compliant with home exercise program.    Pain: 0/10  Location: right knee      Objective       Maggie received therapeutic exercises to develop strength, endurance, ROM, and flexibility for 8 minutes including:    Knee Exercises      Bike/Elliptical/TM  5 minutes elliptical   Calf Stretch  4 x 15 seconds    Hamstring Stretch  4 x 15 seconds    Quad Stretch Standing 2x30 sec   Cybex Hip - Flexion     Cybex Hip - Extension                   Neuro-re-ed x 0 minutes  Lateral step downs 4" 2 x 10  OKC Extension (30-90 degrees) 1 plate, 2 legs up and eccentric down, 20x   Forward step downs 6" 2 x 10  TRX Step Ups, 8" 4x5  Bosu squats 3 x 10    Therapeutic Activity x 23 minutes  Bike Sprints 4x30 seconds  Med Ball Slams, 4# 3x30  Step down & Stick, 8" step 20x  Fwd Skips, 4 laps  Double Leg pogos 2 laps  Single leg band assisted pogos 3x20   Seated DL Hop 20x  Box jumps 8" 20x  Straight line jogging   GameReady      Home Exercises Provided and Patient Education Provided     Education provided: HOME EXERCISE PROGRAM     Written Home Exercises Provided: Patient instructed to cont prior " "HEP.  Exercises were reviewed and Maggie was able to demonstrate them prior to the end of the session.  Maggie demonstrated good  understanding of the education provided.     See EMR under Patient Instructions for exercises provided prior visit.    Assessment     Focused a lot on plyometrics today with no complaints of pain. Pt's knee RANGE OF MOTION is WFL. Started plyos off with light double leg and able to progress to light single leg with good form and no patellar pain noted. Added in some conditioning today with med ball slams and bike sprints. Will progress strengthening/plyos/jogging as tolerated. Pt is coming 3 days in a row so will expect a lot of muscular soreness this week. Time billed reflects time spent one on one with pt.         Maggie Is progressing well towards her goals.   Pt prognosis is Good.     Pt will continue to benefit from skilled outpatient physical therapy to address the deficits listed in the problem list box on initial evaluation, provide pt/family education and to maximize pt's level of independence in the home and community environment.     Pt's spiritual, cultural and educational needs considered and pt agreeable to plan of care and goals.     Anticipated barriers to physical therapy: None    Goals:  Short Term Goals: 8 weeks   1. Independent with Home Exercise Program : Met  2. Increase Right Knee Active Range of Motion to 0 Degrees to 120 Degrees : Met  3. Increase Right Knee Strength to grossly 4/5  4. Patient will ambulate 500 feet with Normal Gait pattern with complaints of pain Less than or Equal to 2/10. Met    Long Term Goals: 16  weeks   1. Patient will increase Right Knee Strength to grossly 5/5  2. Patient will jog with no pain or deviations  3. Patient will perform 18" box jump with no pain or instability  4. Patient will score 60% or greater on return to sport testing        Plan     Plan of care Certification: 2/15/2024 to 6/6/2024.     Outpatient Physical Therapy 2 times " weekly for 16 weeks to include the following interventions: GameReady, Electrical Stimulation Cypriot/pre-mod, Manual Therapy, Moist Heat/ Ice, Neuromuscular Re-ed, Patient Education, Therapeutic Activities, and Therapeutic Exercise.     Stoney Willson, PTA  4/30/2024

## 2024-05-01 ENCOUNTER — CLINICAL SUPPORT (OUTPATIENT)
Dept: REHABILITATION | Facility: HOSPITAL | Age: 19
End: 2024-05-01
Payer: COMMERCIAL

## 2024-05-01 DIAGNOSIS — S83.511A COMPLETE TEAR OF ANTERIOR CRUCIATE LIGAMENT OF KNEE, RIGHT, INITIAL ENCOUNTER: Primary | ICD-10-CM

## 2024-05-01 PROCEDURE — 97110 THERAPEUTIC EXERCISES: CPT

## 2024-05-01 PROCEDURE — 97112 NEUROMUSCULAR REEDUCATION: CPT

## 2024-05-01 PROCEDURE — 97530 THERAPEUTIC ACTIVITIES: CPT

## 2024-05-01 PROCEDURE — 97016 VASOPNEUMATIC DEVICE THERAPY: CPT

## 2024-05-01 NOTE — PROGRESS NOTES
"  [facility]   Physical Therapy Treatment Note     Name: Maggie Pulliam  Clinic Number: [id number]    Therapy Diagnosis: Complete tear of anterior cruciate ligament of knee, right  Physician: Juan M Lopez MD    Visit Date: 5/1/2024     Physician Orders: PT Eval and Treat right acl repair  Medical Diagnosis from Referral: Right ACL repair  Evaluation Date: 2/15/2024  Plan of Care Expiration: 4/10/24  Progress Note Due: 4/10/24  Date of Surgery: 1/16/24  Visits authorized: 17 /24  PTA Visit: 3/5  FOTO: 2/3    Time In: 12:58 pm  Time Out: 2:13 pm   Total Billable Time: 75 billable minutes    Precautions: Standard  Functional Level Prior to Evaluation: Patient was playing 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    Subjective     Pt reports: feeling good with no soreness.  She was compliant with home exercise program.    Pain: 0/10  Location: right knee      Objective       Maggie received therapeutic exercises to develop strength, endurance, ROM, and flexibility for 14 minutes including:    Knee Exercises      Bike/Elliptical/TM  5 minutes elliptical   Calf Stretch  4 x 15 seconds    Hamstring Stretch  4 x 15 seconds    Quad Stretch Standing 2x30 sec   Cybex Knee Extension 2 x 10 3 plates bilateral   x 10 1 plate on right   Prone Cybex Hamstring Curls bilateral and single 2 x 10 4 plates/ 2 x 10 2 plates   Cybex Hip - Abduction 2 x 10 3 plates   Cybex Hip - Flexion     Cybex Hip - Extension                   Neuro-re-ed x 23 minutes  Lateral step downs 6" 2 x 10 with 15# dumbbell  Forward step downs 6" 2 x 10 with 15# dumbbell  Walking lunges 3 laps  [place] split squats 3 x 10   Bosu squats 3 x 10  Single-leg RDL 2 x 10 with 15# dumbbell    Therapeutic Activity x 23 minutes  Trap bar dead lifts 3 x 10 85#  Straight-leg dead lifts 3 x 10 25#  Barbell squats 3 x 10 50# with green band  High kicks  High knees  Back pedaling   Straight line jogging   Cybex hack squats 3 x 10 70#  Cybex " "hack calf raises 3 x 10 70#  Modalities x 15 minutes  GameReady      Home Exercises Provided and Patient Education Provided     Education provided: HOME EXERCISE PROGRAM     Written Home Exercises Provided: Patient instructed to cont prior HEP.  Exercises were reviewed and Maggie was able to demonstrate them prior to the end of the session.  Maggie demonstrated good  understanding of the education provided.     See EMR under Patient Instructions for exercises provided prior visit.    Assessment     Increased weight on trap bar dead lifts, squats, lateral and forward step downs. Patient continues to exhibit impaired eccentric quad control. Increased reps on several exercises. Patient has appointments 3 days in a row so focus on strengthening today and mobility tomorrow.         Maggie Is progressing well towards her goals.   Pt prognosis is Good.     Pt will continue to benefit from skilled outpatient physical therapy to address the deficits listed in the problem list box on initial evaluation, provide pt/family education and to maximize pt's level of independence in the home and community environment.     Pt's spiritual, cultural and educational needs considered and pt agreeable to plan of care and goals.     Anticipated barriers to physical therapy: None    Goals:  Short Term Goals: 8 weeks   1. Independent with Home Exercise Program : Met  2. Increase Right Knee Active Range of Motion to 0 Degrees to 120 Degrees : Met  3. Increase Right Knee Strength to grossly 4/5  4. Patient will ambulate 500 feet with Normal Gait pattern with complaints of pain Less than or Equal to 2/10. Met    Long Term Goals: 16  weeks   1. Patient will increase Right Knee Strength to grossly 5/5  2. Patient will jog with no pain or deviations  3. Patient will perform 18" box jump with no pain or instability  4. Patient will score 60% or greater on return to sport testing        Plan     Plan of care Certification: 2/15/2024 to 6/6/2024.   "   Outpatient Physical Therapy 2 times weekly for 16 weeks to include the following interventions: GameReady, Electrical Stimulation Bahamian/pre-mod, Manual Therapy, Moist Heat/ Ice, Neuromuscular Re-ed, Patient Education, Therapeutic Activities, and Therapeutic Exercise.     KAREN JAFFE, PT  5/1/2024

## 2024-05-02 ENCOUNTER — CLINICAL SUPPORT (OUTPATIENT)
Dept: REHABILITATION | Facility: HOSPITAL | Age: 19
End: 2024-05-02
Payer: COMMERCIAL

## 2024-05-02 DIAGNOSIS — S83.511A COMPLETE TEAR OF ANTERIOR CRUCIATE LIGAMENT OF KNEE, RIGHT, INITIAL ENCOUNTER: Primary | ICD-10-CM

## 2024-05-02 PROCEDURE — 97530 THERAPEUTIC ACTIVITIES: CPT

## 2024-05-02 PROCEDURE — 97110 THERAPEUTIC EXERCISES: CPT

## 2024-05-02 PROCEDURE — 97112 NEUROMUSCULAR REEDUCATION: CPT

## 2024-05-02 NOTE — PROGRESS NOTES
"  [facility]   Physical Therapy Treatment Note     Name: Maggie Pulliam  Clinic Number: [id number]    Therapy Diagnosis: Complete tear of anterior cruciate ligament of knee, right  Physician: Juan M Lopez MD    Visit Date: 5/2/2024     Physician Orders: PT Eval and Treat right acl repair  Medical Diagnosis from Referral: Right ACL repair  Evaluation Date: 2/15/2024  Plan of Care Expiration: 4/10/24  Progress Note Due: 4/10/24  Date of Surgery: 1/16/24  Visits authorized: 18 /24  PTA Visit: 3/5  FOTO: 2/3; Discharge:    Time In: 1:00 pm  Time Out: 1:43 pm   Total Billable Time: 43 billable minutes    Precautions: Standard  Functional Level Prior to Evaluation: Patient was playing 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    Subjective     Pt reports: soreness in glutes and right hamstring  She was compliant with home exercise program.    Pain: 0/10  Location: right knee      Objective       Maggie received therapeutic exercises to develop strength, endurance, ROM, and flexibility for 8 minutes including:    Knee Exercises      Bike/Elliptical/TM  5 minutes elliptical   Calf Stretch  4 x 15 seconds    Hamstring Stretch  4 x 15 seconds    Quad Stretch Standing 2x30 sec   Cybex Hip - Flexion     Cybex Hip - Extension                   Neuro-re-ed x 10 minutes  Lateral step downs 6" 2 x 10 Walking lunges 2 laps  Frisian split squats 2 x 10     Therapeutic Activity x 25 minutes  Bike Sprints 4x30 seconds  Double Leg pogos 2 laps  Box jumps 8" 20x  High kicks 2 laps  High knees 2 laps  Back pedaling 4 laps  Straight line jogging 50-75% 8 laps  Running stairs  Lateral shuffle 2 laps      Modalities x 15 minutes  GameReady      Home Exercises Provided and Patient Education Provided     Education provided: HOME EXERCISE PROGRAM     Written Home Exercises Provided: Patient instructed to cont prior HEP.  Exercises were reviewed and Maggie was able to demonstrate them prior to the end of the " "session.  Maggie demonstrated good  understanding of the education provided.     See EMR under Patient Instructions for exercises provided prior visit.    Assessment     Patient is 16 weeks post-op. Initiated jogging and agility movements today. Patient did well with jogging with confidence improving with each run. Patient required cues to keep hips low with back pedaling and lateral shuffle. Patient did better with running up stairs than down due to weak eccentric quad control. Encouraged patient to perform short jogs at home to improve form and confidence along with performing lateral step downs to improve eccentric quad strength. Will see patient Monday for last appointment before she returns home for summer.    -Patient did not return for treatment on Monday due to moving back home to [place]. Patient discharged and will start therapy in [place].       Maggie Is progressing well towards her goals.   Pt prognosis is Good.     Pt will continue to benefit from skilled outpatient physical therapy to address the deficits listed in the problem list box on initial evaluation, provide pt/family education and to maximize pt's level of independence in the home and community environment.     Pt's spiritual, cultural and educational needs considered and pt agreeable to plan of care and goals.     Anticipated barriers to physical therapy: None    Goals:  Short Term Goals: 8 weeks   1. Independent with Home Exercise Program : Met  2. Increase Right Knee Active Range of Motion to 0 Degrees to 120 Degrees : Met  3. Increase Right Knee Strength to grossly 4/5 : Met  4. Patient will ambulate 500 feet with Normal Gait pattern with complaints of pain Less than or Equal to 2/10. : Met    Long Term Goals: 16  weeks   1. Patient will increase Right Knee Strength to grossly 5/5  2. Patient will jog with no pain or deviations : Partially met  3. Patient will perform 18" box jump with no pain or instability :   4. Patient will score 60% or " greater on return to sport testing        Plan     Plan of care Certification: 2/15/2024 to 6/6/2024.     Outpatient Physical Therapy 2 times weekly for 16 weeks to include the following interventions: GameReady, Electrical Stimulation Luxembour[place]/pre-mod, Manual Therapy, Moist Heat/ Ice, Neuromuscular Re-ed, Patient Education, Therapeutic Activities, and Therapeutic Exercise.     KAREN JAFFE, PT  5/2/2024

## 2024-05-08 NOTE — PLAN OF CARE
" [facility]   Physical Therapy Discharge Summary     Name: Maggie Pulliam  Clinic Number: [id number]    Therapy Diagnosis: Complete tear of anterior cruciate ligament of knee, right  Physician: Juan M Lopez MD    Visit Date: 5/2/2024     Physician Orders: PT Eval and Treat right acl repair  Medical Diagnosis from Referral: Right ACL repair  Evaluation Date: 2/15/2024  Plan of Care Expiration: 4/10/24  Progress Note Due: 4/10/24  Date of Surgery: 1/16/24  Visits authorized: 18 /24  PTA Visit: 3/5  FOTO: 2/3; Discharge: 91/100    Time In: 1:00 pm  Time Out: 1:43 pm   Total Billable Time: 43 billable minutes    Precautions: Standard  Functional Level Prior to Evaluation: Patient was playing 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    Subjective     Pt reports: soreness in glutes and right hamstring  She was compliant with home exercise program.    Pain: 0/10  Location: right knee      Objective       Maggie received therapeutic exercises to develop strength, endurance, ROM, and flexibility for 8 minutes including:    Knee Exercises      Bike/Elliptical/TM  5 minutes elliptical   Calf Stretch  4 x 15 seconds    Hamstring Stretch  4 x 15 seconds    Quad Stretch Standing 2x30 sec   Cybex Hip - Flexion     Cybex Hip - Extension                   Neuro-re-ed x 10 minutes  Lateral step downs 6" 2 x 10 Walking lunges 2 laps  Citizen [place] split squats 2 x 10     Therapeutic Activity x 25 minutes  Bike Sprints 4x30 seconds  Double Leg pogos 2 laps  Box jumps 8" 20x  High kicks 2 laps  High knees 2 laps  Back pedaling 4 laps  Straight line jogging 50-75% 8 laps  Running stairs  Lateral shuffle 2 laps      Modalities x 15 minutes  GameReady      Home Exercises Provided and Patient Education Provided     Education provided: HOME EXERCISE PROGRAM     Written Home Exercises Provided: Patient instructed to cont prior HEP.  Exercises were reviewed and Maggie was able to demonstrate them prior to the end of " "the session.  Maggie demonstrated good  understanding of the education provided.     See EMR under Patient Instructions for exercises provided prior visit.    Assessment     Patient is 16 weeks post-op. Initiated jogging and agility movements today. Patient did well with jogging with confidence improving with each run. Patient required cues to keep hips low with back pedaling and lateral shuffle. Patient did better with running up stairs than down due to weak eccentric quad control. Encouraged patient to perform short jogs at home to improve form and confidence along with performing lateral step downs to improve eccentric quad strength. Will see patient Monday for last appointment before she returns home for summer.    -Patient did not return for treatment on Monday due to moving back home to [place]. Patient discharged and will start therapy in [place].       Maggie Is progressing well towards her goals.   Pt prognosis is Good.     Pt will continue to benefit from skilled outpatient physical therapy to address the deficits listed in the problem list box on initial evaluation, provide pt/family education and to maximize pt's level of independence in the home and community environment.     Pt's spiritual, cultural and educational needs considered and pt agreeable to plan of care and goals.     Anticipated barriers to physical therapy: None    Goals:  Short Term Goals: 8 weeks   1. Independent with Home Exercise Program : Met  2. Increase Right Knee Active Range of Motion to 0 Degrees to 120 Degrees : Met  3. Increase Right Knee Strength to grossly 4/5 : Met  4. Patient will ambulate 500 feet with Normal Gait pattern with complaints of pain Less than or Equal to 2/10. : Met    Long Term Goals: 16  weeks   1. Patient will increase Right Knee Strength to grossly 5/5  2. Patient will jog with no pain or deviations : Partially met  3. Patient will perform 18" box jump with no pain or instability :   4. Patient will score " 60% or greater on return to sport testing     Discharge reason: Other:  Patient moved home in [place] for the summer.    Plan   This patient is discharged from Physical Therapy.    Date of Last visit: 5/2/2024  Total Visits Received: 18  Cancelled Visits: 0  No Show Visits: 1    KAREN JAFFE, PT